# Patient Record
Sex: FEMALE | Race: WHITE | Employment: OTHER | URBAN - METROPOLITAN AREA
[De-identification: names, ages, dates, MRNs, and addresses within clinical notes are randomized per-mention and may not be internally consistent; named-entity substitution may affect disease eponyms.]

---

## 2020-03-04 ENCOUNTER — APPOINTMENT (OUTPATIENT)
Dept: GENERAL RADIOLOGY | Age: 70
End: 2020-03-04
Payer: MEDICARE

## 2020-03-04 ENCOUNTER — HOSPITAL ENCOUNTER (EMERGENCY)
Age: 70
Discharge: HOME | End: 2020-03-04
Attending: EMERGENCY MEDICINE
Payer: MEDICARE

## 2020-03-04 VITALS
DIASTOLIC BLOOD PRESSURE: 89 MMHG | WEIGHT: 280 LBS | HEIGHT: 67 IN | OXYGEN SATURATION: 97 % | TEMPERATURE: 97.9 F | RESPIRATION RATE: 20 BRPM | HEART RATE: 99 BPM | SYSTOLIC BLOOD PRESSURE: 175 MMHG | BODY MASS INDEX: 43.95 KG/M2

## 2020-03-04 PROCEDURE — 99284 EMERGENCY DEPT VISIT MOD MDM: CPT

## 2020-03-04 PROCEDURE — 6370000000 HC RX 637 (ALT 250 FOR IP): Performed by: EMERGENCY MEDICINE

## 2020-03-04 RX ORDER — HYDROXYZINE PAMOATE 25 MG/1
25 CAPSULE ORAL 3 TIMES DAILY PRN
Qty: 21 CAPSULE | Refills: 0 | Status: SHIPPED | OUTPATIENT
Start: 2020-03-04 | End: 2020-03-11

## 2020-03-04 RX ORDER — CEPHALEXIN 500 MG/1
500 CAPSULE ORAL 4 TIMES DAILY
Qty: 28 CAPSULE | Refills: 0 | Status: SHIPPED | OUTPATIENT
Start: 2020-03-04 | End: 2020-03-11

## 2020-03-04 RX ORDER — PERMETHRIN 50 MG/G
CREAM TOPICAL
Qty: 60 G | Refills: 1 | Status: SHIPPED | OUTPATIENT
Start: 2020-03-04 | End: 2021-01-01 | Stop reason: ALTCHOICE

## 2020-03-04 RX ORDER — HYDROXYZINE PAMOATE 25 MG/1
25 CAPSULE ORAL ONCE
Status: COMPLETED | OUTPATIENT
Start: 2020-03-04 | End: 2020-03-04

## 2020-03-04 RX ADMIN — HYDROXYZINE PAMOATE 25 MG: 25 CAPSULE ORAL at 02:38

## 2020-03-04 ASSESSMENT — ENCOUNTER SYMPTOMS
ABDOMINAL PAIN: 0
ABDOMINAL DISTENTION: 0
SORE THROAT: 0
DIARRHEA: 0
BACK PAIN: 0
NAUSEA: 0
SHORTNESS OF BREATH: 0
VOMITING: 0
COUGH: 0
SINUS PRESSURE: 0
WHEEZING: 0
CHEST TIGHTNESS: 0

## 2020-03-04 NOTE — ED NOTES
Placed on bedpan per this RN.  complaining of wait time for transport. Explained to gentlemen the ED cannot dictate when pt. Will be picked up. Ambulance service gives ED ETA.      Fercho Logan RN  03/04/20 [id number]

## 2020-03-04 NOTE — ED NOTES
Pt. Now refusing x-ray. Requesting something for itching of lower leg.       Janell Smith RN  03/04/20 [id number]

## 2020-03-04 NOTE — ED NOTES
Pt. States she's been cutting her toenails herself. Bilateral great toenails nearly absent. Surrounding skin noted to be peeled away/area reddened/appears seeping serous drainage at times.      Swapnil Buchanan RN  03/04/20 0415

## 2020-03-04 NOTE — ED PROVIDER NOTES
Normal range of motion and neck supple. Normal range of motion. No neck rigidity, injury, pain with movement, spinous process tenderness or muscular tenderness. Cardiovascular:      Rate and Rhythm: Normal rate and regular rhythm. Heart sounds: Normal heart sounds. No murmur. Pulmonary:      Effort: Pulmonary effort is normal. No respiratory distress. Breath sounds: Normal breath sounds. No stridor, decreased air movement or transmitted upper airway sounds. No decreased breath sounds, wheezing, rhonchi or rales. Chest:      Chest wall: No tenderness. Abdominal:      General: Bowel sounds are normal. There is no distension. Palpations: Abdomen is soft. Tenderness: There is no abdominal tenderness. There is no right CVA tenderness, left CVA tenderness, guarding or rebound. Musculoskeletal:         General: No swelling, tenderness, deformity or signs of injury. Right lower leg: No edema. Left lower leg: No edema. Comments: Patient complains of right knee pain but has no tenderness during palpation and no swelling or effusion. Musculoskeletal exam reveals no sign of acute bony or joint injury. She has no sign of acute head or face injury. No cervical, thoracic or lumbar spine tenderness. Chest wall, sternum and clavicles nontender. Pelvis is stable and nontender. Lymphadenopathy:      Cervical: No cervical adenopathy. Skin:     General: Skin is warm and dry. Coloration: Skin is not cyanotic, jaundiced, mottled or pale. Findings: Erythema present. Comments: Multiple linear scratches, abrasions to the bilateral lower extremities below the knees. No signs of abscess. No swelling or fluctuance. No particular tenderness. Several areas of mildly excoriated skin from patient scratching at it. Possible early cellulitis starting secondary to healthy scratches. No abscesses. I see no insect bites or insects.    Neurological:      General: No focal deficit present. Mental Status: She is alert and oriented to person, place, and time. GCS: GCS eye subscore is 4. GCS verbal subscore is 5. GCS motor subscore is 6. Cranial Nerves: No cranial nerve deficit. Coordination: Coordination normal.          Procedures     MDM              --------------------------------------------- PAST HISTORY ---------------------------------------------  Past Medical History:  has no past medical history on file. Past Surgical History:  has no past surgical history on file. Social History:  reports that she has never smoked. She has never used smokeless tobacco.    Family History: family history is not on file. The patients home medications have been reviewed. Allergies: Patient has no allergy information on record.    -------------------------------------------------- RESULTS -------------------------------------------------  Labs:  No results found for this visit on 03/04/20. Radiology:  No orders to display   Please note the patient refused the x-ray    ------------------------- NURSING NOTES AND VITALS REVIEWED ---------------------------  Date / Time Roomed:  3/4/2020  2:09 AM  ED Bed Assignment:  04/04    The nursing notes within the ED encounter and vital signs as below have been reviewed. BP (!) 175/89   Pulse 99   Temp 97.9 °F (36.6 °C) (Oral)   Resp 20   Ht 5' 7\" (1.702 m)   Wt 280 lb (127 kg)   SpO2 97%   BMI 43.85 kg/m²   Oxygen Saturation Interpretation: Normal      ------------------------------------------ PROGRESS NOTES ------------------------------------------  I have spoken with the patient and discussed todays results, in addition to providing specific details for the plan of care and counseling regarding the diagnosis and prognosis. Their questions are answered at this time and they are agreeable with the plan. I discussed at length with them reasons for immediate return here for re evaluation.  They will followup with

## 2021-01-01 ENCOUNTER — APPOINTMENT (OUTPATIENT)
Dept: MRI IMAGING | Age: 71
DRG: 871 | End: 2021-01-01
Payer: MEDICARE

## 2021-01-01 ENCOUNTER — APPOINTMENT (OUTPATIENT)
Dept: INTERVENTIONAL RADIOLOGY/VASCULAR | Age: 71
DRG: 871 | End: 2021-01-01
Payer: MEDICARE

## 2021-01-01 ENCOUNTER — APPOINTMENT (OUTPATIENT)
Dept: GENERAL RADIOLOGY | Age: 71
DRG: 871 | End: 2021-01-01
Payer: MEDICARE

## 2021-01-01 ENCOUNTER — APPOINTMENT (OUTPATIENT)
Dept: CT IMAGING | Age: 71
DRG: 871 | End: 2021-01-01
Payer: MEDICARE

## 2021-01-01 ENCOUNTER — ANESTHESIA EVENT (OUTPATIENT)
Dept: ENDOSCOPY | Age: 71
End: 2021-01-01

## 2021-01-01 ENCOUNTER — HOSPITAL ENCOUNTER (INPATIENT)
Age: 71
LOS: 2 days | DRG: 871 | End: 2021-11-23
Attending: STUDENT IN AN ORGANIZED HEALTH CARE EDUCATION/TRAINING PROGRAM | Admitting: STUDENT IN AN ORGANIZED HEALTH CARE EDUCATION/TRAINING PROGRAM
Payer: MEDICARE

## 2021-01-01 ENCOUNTER — ANESTHESIA (OUTPATIENT)
Dept: ENDOSCOPY | Age: 71
End: 2021-01-01

## 2021-01-01 ENCOUNTER — APPOINTMENT (OUTPATIENT)
Dept: ULTRASOUND IMAGING | Age: 71
DRG: 871 | End: 2021-01-01
Payer: MEDICARE

## 2021-01-01 VITALS
WEIGHT: 293 LBS | DIASTOLIC BLOOD PRESSURE: 45 MMHG | SYSTOLIC BLOOD PRESSURE: 90 MMHG | OXYGEN SATURATION: 71 % | TEMPERATURE: 98 F | HEIGHT: 68 IN | BODY MASS INDEX: 44.41 KG/M2

## 2021-01-01 DIAGNOSIS — S32.020A COMPRESSION FRACTURE OF L2 VERTEBRA, INITIAL ENCOUNTER (HCC): ICD-10-CM

## 2021-01-01 DIAGNOSIS — R77.8 ELEVATED TROPONIN: Primary | ICD-10-CM

## 2021-01-01 LAB
ADENOVIRUS BY PCR: NOT DETECTED
ALBUMIN SERPL-MCNC: 2.1 G/DL (ref 3.5–5.2)
ALBUMIN SERPL-MCNC: 3.4 G/DL (ref 3.5–5.2)
ALP BLD-CCNC: 130 U/L (ref 35–104)
ALP BLD-CCNC: 86 U/L (ref 35–104)
ALT SERPL-CCNC: 26 U/L (ref 0–32)
ALT SERPL-CCNC: 62 U/L (ref 0–32)
ANION GAP SERPL CALCULATED.3IONS-SCNC: 12 MMOL/L (ref 7–16)
ANION GAP SERPL CALCULATED.3IONS-SCNC: 12 MMOL/L (ref 7–16)
ANION GAP SERPL CALCULATED.3IONS-SCNC: 13 MMOL/L (ref 7–16)
ANION GAP SERPL CALCULATED.3IONS-SCNC: 14 MMOL/L (ref 7–16)
ANION GAP SERPL CALCULATED.3IONS-SCNC: 14 MMOL/L (ref 7–16)
ANION GAP SERPL CALCULATED.3IONS-SCNC: 9 MMOL/L (ref 7–16)
AST SERPL-CCNC: 141 U/L (ref 0–31)
AST SERPL-CCNC: 31 U/L (ref 0–31)
ATYPICAL LYMPHOCYTE RELATIVE PERCENT: 1.7 % (ref 0–4)
B.E.: -6.7 MMOL/L (ref -3–3)
BACTERIA: ABNORMAL /HPF
BASOPHILS ABSOLUTE: 0 E9/L (ref 0–0.2)
BASOPHILS ABSOLUTE: 0.02 E9/L (ref 0–0.2)
BASOPHILS ABSOLUTE: 0.14 E9/L (ref 0–0.2)
BASOPHILS RELATIVE PERCENT: 0.1 % (ref 0–2)
BASOPHILS RELATIVE PERCENT: 0.1 % (ref 0–2)
BASOPHILS RELATIVE PERCENT: 0.2 % (ref 0–2)
BASOPHILS RELATIVE PERCENT: 0.2 % (ref 0–2)
BASOPHILS RELATIVE PERCENT: 0.3 % (ref 0–2)
BASOPHILS RELATIVE PERCENT: 0.9 % (ref 0–2)
BETA-HYDROXYBUTYRATE: 0.7 MMOL/L (ref 0.02–0.27)
BILIRUB SERPL-MCNC: 0.8 MG/DL (ref 0–1.2)
BILIRUB SERPL-MCNC: 0.9 MG/DL (ref 0–1.2)
BILIRUBIN URINE: NEGATIVE
BLOOD, URINE: ABNORMAL
BORDETELLA PARAPERTUSSIS BY PCR: NOT DETECTED
BORDETELLA PERTUSSIS BY PCR: NOT DETECTED
BOTTLE TYPE: ABNORMAL
BUN BLDV-MCNC: 14 MG/DL (ref 6–23)
BUN BLDV-MCNC: 16 MG/DL (ref 6–23)
BUN BLDV-MCNC: 19 MG/DL (ref 6–23)
BUN BLDV-MCNC: 27 MG/DL (ref 6–23)
BUN BLDV-MCNC: 42 MG/DL (ref 6–23)
BUN BLDV-MCNC: 44 MG/DL (ref 6–23)
BURR CELLS: ABNORMAL
BURR CELLS: ABNORMAL
C-REACTIVE PROTEIN: 44.2 MG/DL (ref 0–0.4)
CALCIUM SERPL-MCNC: 7.6 MG/DL (ref 8.6–10.2)
CALCIUM SERPL-MCNC: 7.6 MG/DL (ref 8.6–10.2)
CALCIUM SERPL-MCNC: 7.7 MG/DL (ref 8.6–10.2)
CALCIUM SERPL-MCNC: 7.7 MG/DL (ref 8.6–10.2)
CALCIUM SERPL-MCNC: 7.8 MG/DL (ref 8.6–10.2)
CALCIUM SERPL-MCNC: 8.6 MG/DL (ref 8.6–10.2)
CANDIDA ALBICANS BY PCR: NOT DETECTED
CANDIDA GLABRATA BY PCR: NOT DETECTED
CANDIDA KRUSEI BY PCR: NOT DETECTED
CANDIDA PARAPSILOSIS BY PCR: NOT DETECTED
CANDIDA TROPICALIS BY PCR: NOT DETECTED
CHLAMYDOPHILIA PNEUMONIAE BY PCR: NOT DETECTED
CHLORIDE BLD-SCNC: 96 MMOL/L (ref 98–107)
CHLORIDE BLD-SCNC: 97 MMOL/L (ref 98–107)
CHLORIDE BLD-SCNC: 98 MMOL/L (ref 98–107)
CHLORIDE BLD-SCNC: 99 MMOL/L (ref 98–107)
CHOLESTEROL, TOTAL: 98 MG/DL (ref 0–199)
CLARITY: CLEAR
CO2: 19 MMOL/L (ref 22–29)
CO2: 19 MMOL/L (ref 22–29)
CO2: 22 MMOL/L (ref 22–29)
CO2: 24 MMOL/L (ref 22–29)
COHB: 0.3 % (ref 0–1.5)
COLOR: YELLOW
CORONAVIRUS 229E BY PCR: NOT DETECTED
CORONAVIRUS HKU1 BY PCR: NOT DETECTED
CORONAVIRUS NL63 BY PCR: NOT DETECTED
CORONAVIRUS OC43 BY PCR: NOT DETECTED
CREAT SERPL-MCNC: 0.7 MG/DL (ref 0.5–1)
CREAT SERPL-MCNC: 0.8 MG/DL (ref 0.5–1)
CREAT SERPL-MCNC: 0.9 MG/DL (ref 0.5–1)
CREAT SERPL-MCNC: 1.5 MG/DL (ref 0.5–1)
CREAT SERPL-MCNC: 2.2 MG/DL (ref 0.5–1)
CREAT SERPL-MCNC: 2.3 MG/DL (ref 0.5–1)
CREATININE URINE: 465 MG/DL (ref 29–226)
CRITICAL: ABNORMAL
DATE ANALYZED: ABNORMAL
DATE OF COLLECTION: ABNORMAL
DOHLE BODIES: ABNORMAL
DOHLE BODIES: ABNORMAL
EKG ATRIAL RATE: 106 BPM
EKG P AXIS: 61 DEGREES
EKG P-R INTERVAL: 152 MS
EKG Q-T INTERVAL: 352 MS
EKG QRS DURATION: 82 MS
EKG QTC CALCULATION (BAZETT): 467 MS
EKG R AXIS: -13 DEGREES
EKG T AXIS: 29 DEGREES
EKG VENTRICULAR RATE: 106 BPM
ENTEROBACTER CLOACAE COMPLEX BY PCR: NOT DETECTED
ENTEROBACTERALES BY PCR: NOT DETECTED
ENTEROCOCCUS BY PCR: NOT DETECTED
EOSINOPHILS ABSOLUTE: 0 E9/L (ref 0.05–0.5)
EOSINOPHILS RELATIVE PERCENT: 0 % (ref 0–6)
EOSINOPHILS RELATIVE PERCENT: 0.1 % (ref 0–6)
EOSINOPHILS RELATIVE PERCENT: 0.5 % (ref 0–6)
EPITHELIAL CELLS, UA: ABNORMAL /HPF
ESCHERICHIA COLI BY PCR: NOT DETECTED
FERRITIN: 1099 NG/ML
FIO2: 80 %
GFR AFRICAN AMERICAN: 25
GFR AFRICAN AMERICAN: 27
GFR AFRICAN AMERICAN: 41
GFR AFRICAN AMERICAN: >60
GFR NON-AFRICAN AMERICAN: 21 ML/MIN/1.73
GFR NON-AFRICAN AMERICAN: 22 ML/MIN/1.73
GFR NON-AFRICAN AMERICAN: 34 ML/MIN/1.73
GFR NON-AFRICAN AMERICAN: >60 ML/MIN/1.73
GLUCOSE BLD-MCNC: 167 MG/DL (ref 74–99)
GLUCOSE BLD-MCNC: 203 MG/DL (ref 74–99)
GLUCOSE BLD-MCNC: 212 MG/DL (ref 74–99)
GLUCOSE BLD-MCNC: 222 MG/DL (ref 74–99)
GLUCOSE BLD-MCNC: 252 MG/DL (ref 74–99)
GLUCOSE BLD-MCNC: 69 MG/DL (ref 74–99)
GLUCOSE URINE: 500 MG/DL
GRAM STAIN ORDERABLE: NORMAL
HAEMOPHILUS INFLUENZAE BY PCR: NOT DETECTED
HBA1C MFR BLD: 7.9 % (ref 4–5.6)
HCO3: 18 MMOL/L (ref 22–26)
HCT VFR BLD CALC: 28 % (ref 34–48)
HCT VFR BLD CALC: 28.1 % (ref 34–48)
HCT VFR BLD CALC: 28.1 % (ref 34–48)
HCT VFR BLD CALC: 31.2 % (ref 34–48)
HCT VFR BLD CALC: 33.5 % (ref 34–48)
HCT VFR BLD CALC: 37.5 % (ref 34–48)
HDLC SERPL-MCNC: 25 MG/DL
HEMOGLOBIN: 10.6 G/DL (ref 11.5–15.5)
HEMOGLOBIN: 12.2 G/DL (ref 11.5–15.5)
HEMOGLOBIN: 8.9 G/DL (ref 11.5–15.5)
HEMOGLOBIN: 9 G/DL (ref 11.5–15.5)
HEMOGLOBIN: 9.3 G/DL (ref 11.5–15.5)
HEMOGLOBIN: 9.8 G/DL (ref 11.5–15.5)
HHB: 6 % (ref 0–5)
HUMAN METAPNEUMOVIRUS BY PCR: NOT DETECTED
HUMAN RHINOVIRUS/ENTEROVIRUS BY PCR: NOT DETECTED
HYPOCHROMIA: ABNORMAL
IMMATURE GRANULOCYTES #: 0.13 E9/L
IMMATURE GRANULOCYTES %: 0.9 % (ref 0–5)
INFLUENZA A BY PCR: NOT DETECTED
INFLUENZA B BY PCR: NOT DETECTED
INR BLD: 1.3
IRON SATURATION: 16 % (ref 15–50)
IRON: 19 MCG/DL (ref 37–145)
KETONES, URINE: >=80 MG/DL
KLEBSIELLA OXYTOCA BY PCR: NOT DETECTED
KLEBSIELLA PNEUMONIAE GROUP BY PCR: NOT DETECTED
LAB: ABNORMAL
LACTIC ACID: 1.9 MMOL/L (ref 0.5–2.2)
LACTIC ACID: 2 MMOL/L (ref 0.5–2.2)
LDL CHOLESTEROL CALCULATED: 51 MG/DL (ref 0–99)
LEUKOCYTE ESTERASE, URINE: NEGATIVE
LIPASE: 10 U/L (ref 13–60)
LISTERIA MONOCYTOGENES BY PCR: NOT DETECTED
LV EF: 48 %
LVEF MODALITY: NORMAL
LYMPHOCYTES ABSOLUTE: 0.31 E9/L (ref 1.5–4)
LYMPHOCYTES ABSOLUTE: 0.39 E9/L (ref 1.5–4)
LYMPHOCYTES ABSOLUTE: 0.46 E9/L (ref 1.5–4)
LYMPHOCYTES ABSOLUTE: 0.56 E9/L (ref 1.5–4)
LYMPHOCYTES ABSOLUTE: 0.58 E9/L (ref 1.5–4)
LYMPHOCYTES ABSOLUTE: 0.72 E9/L (ref 1.5–4)
LYMPHOCYTES RELATIVE PERCENT: 0.9 % (ref 20–42)
LYMPHOCYTES RELATIVE PERCENT: 1.8 % (ref 20–42)
LYMPHOCYTES RELATIVE PERCENT: 2.6 % (ref 20–42)
LYMPHOCYTES RELATIVE PERCENT: 3.1 % (ref 20–42)
LYMPHOCYTES RELATIVE PERCENT: 3.5 % (ref 20–42)
LYMPHOCYTES RELATIVE PERCENT: 5.2 % (ref 20–42)
Lab: ABNORMAL
MAGNESIUM: 1.8 MG/DL (ref 1.6–2.6)
MCH RBC QN AUTO: 28 PG (ref 26–35)
MCH RBC QN AUTO: 28 PG (ref 26–35)
MCH RBC QN AUTO: 28.3 PG (ref 26–35)
MCH RBC QN AUTO: 28.4 PG (ref 26–35)
MCH RBC QN AUTO: 28.5 PG (ref 26–35)
MCH RBC QN AUTO: 28.7 PG (ref 26–35)
MCHC RBC AUTO-ENTMCNC: 31.4 % (ref 32–34.5)
MCHC RBC AUTO-ENTMCNC: 31.6 % (ref 32–34.5)
MCHC RBC AUTO-ENTMCNC: 31.7 % (ref 32–34.5)
MCHC RBC AUTO-ENTMCNC: 32 % (ref 32–34.5)
MCHC RBC AUTO-ENTMCNC: 32.5 % (ref 32–34.5)
MCHC RBC AUTO-ENTMCNC: 33.2 % (ref 32–34.5)
MCV RBC AUTO: 85.9 FL (ref 80–99.9)
MCV RBC AUTO: 87.2 FL (ref 80–99.9)
MCV RBC AUTO: 88.4 FL (ref 80–99.9)
MCV RBC AUTO: 89.1 FL (ref 80–99.9)
MCV RBC AUTO: 89.3 FL (ref 80–99.9)
MCV RBC AUTO: 89.5 FL (ref 80–99.9)
METER GLUCOSE: 124 MG/DL (ref 74–99)
METER GLUCOSE: 168 MG/DL (ref 74–99)
METER GLUCOSE: 169 MG/DL (ref 74–99)
METER GLUCOSE: 173 MG/DL (ref 74–99)
METER GLUCOSE: 174 MG/DL (ref 74–99)
METER GLUCOSE: 175 MG/DL (ref 74–99)
METER GLUCOSE: 180 MG/DL (ref 74–99)
METER GLUCOSE: 182 MG/DL (ref 74–99)
METER GLUCOSE: 203 MG/DL (ref 74–99)
METER GLUCOSE: 206 MG/DL (ref 74–99)
METER GLUCOSE: 218 MG/DL (ref 74–99)
METER GLUCOSE: 219 MG/DL (ref 74–99)
METER GLUCOSE: 221 MG/DL (ref 74–99)
METER GLUCOSE: 245 MG/DL (ref 74–99)
METER GLUCOSE: 90 MG/DL (ref 74–99)
METHB: 0.3 % (ref 0–1.5)
METHICILLIN RESISTANCE MECA/C  BY PCR: NOT DETECTED
MODE: AC
MONOCYTES ABSOLUTE: 0.13 E9/L (ref 0.1–0.95)
MONOCYTES ABSOLUTE: 0.29 E9/L (ref 0.1–0.95)
MONOCYTES ABSOLUTE: 0.31 E9/L (ref 0.1–0.95)
MONOCYTES ABSOLUTE: 0.4 E9/L (ref 0.1–0.95)
MONOCYTES ABSOLUTE: 0.43 E9/L (ref 0.1–0.95)
MONOCYTES ABSOLUTE: 0.75 E9/L (ref 0.1–0.95)
MONOCYTES RELATIVE PERCENT: 0.9 % (ref 2–12)
MONOCYTES RELATIVE PERCENT: 1.7 % (ref 2–12)
MONOCYTES RELATIVE PERCENT: 1.8 % (ref 2–12)
MONOCYTES RELATIVE PERCENT: 2.6 % (ref 2–12)
MONOCYTES RELATIVE PERCENT: 2.7 % (ref 2–12)
MONOCYTES RELATIVE PERCENT: 3.5 % (ref 2–12)
MYCOPLASMA PNEUMONIAE BY PCR: NOT DETECTED
MYELOCYTE PERCENT: 0.9 % (ref 0–0)
NEISSERIA MENINGITIDIS BY PCR: NOT DETECTED
NEUTROPHILS ABSOLUTE: 12.61 E9/L (ref 1.8–7.3)
NEUTROPHILS ABSOLUTE: 13.25 E9/L (ref 1.8–7.3)
NEUTROPHILS ABSOLUTE: 13.81 E9/L (ref 1.8–7.3)
NEUTROPHILS ABSOLUTE: 13.87 E9/L (ref 1.8–7.3)
NEUTROPHILS ABSOLUTE: 15.07 E9/L (ref 1.8–7.3)
NEUTROPHILS ABSOLUTE: 17.67 E9/L (ref 1.8–7.3)
NEUTROPHILS RELATIVE PERCENT: 92.2 % (ref 43–80)
NEUTROPHILS RELATIVE PERCENT: 93.2 % (ref 43–80)
NEUTROPHILS RELATIVE PERCENT: 93.9 % (ref 43–80)
NEUTROPHILS RELATIVE PERCENT: 93.9 % (ref 43–80)
NEUTROPHILS RELATIVE PERCENT: 95.6 % (ref 43–80)
NEUTROPHILS RELATIVE PERCENT: 96.5 % (ref 43–80)
NITRITE, URINE: NEGATIVE
O2 CONTENT: 13.7 ML/DL
O2 SATURATION: 94 % (ref 92–98.5)
O2HB: 93.4 % (ref 94–97)
OPERATOR ID: 2658
ORDER NUMBER: ABNORMAL
ORGANISM: ABNORMAL
OVALOCYTES: ABNORMAL
OVALOCYTES: ABNORMAL
PARAINFLUENZA VIRUS 1 BY PCR: NOT DETECTED
PARAINFLUENZA VIRUS 2 BY PCR: NOT DETECTED
PARAINFLUENZA VIRUS 3 BY PCR: NOT DETECTED
PARAINFLUENZA VIRUS 4 BY PCR: NOT DETECTED
PATIENT TEMP: 37
PCO2: 32.9 MMHG (ref 35–45)
PDW BLD-RTO: 13.4 FL (ref 11.5–15)
PDW BLD-RTO: 13.5 FL (ref 11.5–15)
PDW BLD-RTO: 13.8 FL (ref 11.5–15)
PDW BLD-RTO: 13.9 FL (ref 11.5–15)
PDW BLD-RTO: 14.1 FL (ref 11.5–15)
PDW BLD-RTO: 14.4 FL (ref 11.5–15)
PEEP/CPAP: 5 CMH2O
PFO2: 0.96 MMHG/%
PH BLOOD GAS: 7.36 (ref 7.35–7.45)
PH UA: 5.5 (ref 5–9)
PH VENOUS: 7.37 (ref 7.35–7.45)
PHOSPHORUS: 4.1 MG/DL (ref 2.5–4.5)
PLATELET # BLD: 143 E9/L (ref 130–450)
PLATELET # BLD: 143 E9/L (ref 130–450)
PLATELET # BLD: 160 E9/L (ref 130–450)
PLATELET # BLD: 164 E9/L (ref 130–450)
PLATELET # BLD: 167 E9/L (ref 130–450)
PLATELET # BLD: 189 E9/L (ref 130–450)
PMV BLD AUTO: 10.6 FL (ref 7–12)
PMV BLD AUTO: 10.7 FL (ref 7–12)
PMV BLD AUTO: 10.9 FL (ref 7–12)
PMV BLD AUTO: 11 FL (ref 7–12)
PMV BLD AUTO: 11.3 FL (ref 7–12)
PMV BLD AUTO: 9.9 FL (ref 7–12)
PO2: 76.5 MMHG (ref 75–100)
POIKILOCYTES: ABNORMAL
POIKILOCYTES: ABNORMAL
POLYCHROMASIA: ABNORMAL
POTASSIUM REFLEX MAGNESIUM: 3.7 MMOL/L (ref 3.5–5)
POTASSIUM REFLEX MAGNESIUM: 3.9 MMOL/L (ref 3.5–5)
POTASSIUM REFLEX MAGNESIUM: 4 MMOL/L (ref 3.5–5)
POTASSIUM REFLEX MAGNESIUM: 4.5 MMOL/L (ref 3.5–5)
POTASSIUM REFLEX MAGNESIUM: 4.6 MMOL/L (ref 3.5–5)
POTASSIUM SERPL-SCNC: 4.6 MMOL/L (ref 3.5–5)
PROCALCITONIN: 4.7 NG/ML (ref 0–0.08)
PROTEIN UA: 30 MG/DL
PROTEUS SPECIES BY PCR: NOT DETECTED
PROTHROMBIN TIME: 14.9 SEC (ref 9.3–12.4)
PSEUDOMONAS AERUGINOSA BY PCR: NOT DETECTED
RBC # BLD: 3.14 E12/L (ref 3.5–5.5)
RBC # BLD: 3.18 E12/L (ref 3.5–5.5)
RBC # BLD: 3.26 E12/L (ref 3.5–5.5)
RBC # BLD: 3.5 E12/L (ref 3.5–5.5)
RBC # BLD: 3.75 E12/L (ref 3.5–5.5)
RBC # BLD: 4.3 E12/L (ref 3.5–5.5)
RBC UA: ABNORMAL /HPF (ref 0–2)
RESPIRATORY SYNCYTIAL VIRUS BY PCR: NOT DETECTED
RR MECHANICAL: 20 B/MIN
SARS-COV-2, NAAT: NOT DETECTED
SARS-COV-2, PCR: NOT DETECTED
SERRATIA MARCESCENS BY PCR: NOT DETECTED
SODIUM BLD-SCNC: 128 MMOL/L (ref 132–146)
SODIUM BLD-SCNC: 129 MMOL/L (ref 132–146)
SODIUM BLD-SCNC: 132 MMOL/L (ref 132–146)
SODIUM BLD-SCNC: 132 MMOL/L (ref 132–146)
SODIUM BLD-SCNC: 133 MMOL/L (ref 132–146)
SODIUM BLD-SCNC: 134 MMOL/L (ref 132–146)
SODIUM URINE: <20 MMOL/L
SOURCE OF BLOOD CULTURE: ABNORMAL
SOURCE, BLOOD GAS: ABNORMAL
SPECIFIC GRAVITY UA: >=1.03 (ref 1–1.03)
STAPHYLOCOCCUS AUREUS BY PCR: DETECTED
STAPHYLOCOCCUS SPECIES BY PCR: DETECTED
STREPTOCOCCUS AGALACTIAE BY PCR: NOT DETECTED
STREPTOCOCCUS PNEUMONIAE BY PCR: NOT DETECTED
STREPTOCOCCUS PYOGENES  BY PCR: NOT DETECTED
STREPTOCOCCUS SPECIES BY PCR: NOT DETECTED
TARGET CELLS: ABNORMAL
THB: 10.4 G/DL (ref 11.5–16.5)
TIME ANALYZED: 1434
TOTAL IRON BINDING CAPACITY: 121 MCG/DL (ref 250–450)
TOTAL PROTEIN: 5.6 G/DL (ref 6.4–8.3)
TOTAL PROTEIN: 6.8 G/DL (ref 6.4–8.3)
TOXIC GRANULATION: ABNORMAL
TOXIC GRANULATION: ABNORMAL
TRIGL SERPL-MCNC: 109 MG/DL (ref 0–149)
TROPONIN, HIGH SENSITIVITY: 112 NG/L (ref 0–9)
TROPONIN, HIGH SENSITIVITY: 190 NG/L (ref 0–9)
TROPONIN, HIGH SENSITIVITY: 58 NG/L (ref 0–9)
TROPONIN, HIGH SENSITIVITY: 83 NG/L (ref 0–9)
TROPONIN, HIGH SENSITIVITY: 99 NG/L (ref 0–9)
TSH SERPL DL<=0.05 MIU/L-ACNC: 0.91 UIU/ML (ref 0.27–4.2)
URINE CULTURE, ROUTINE: ABNORMAL
UROBILINOGEN, URINE: 1 E.U./DL
VANCOMYCIN RANDOM: 16 MCG/ML (ref 5–40)
VLDLC SERPL CALC-MCNC: 22 MG/DL
VT MECHANICAL: 450 ML
WBC # BLD: 13 E9/L (ref 4.5–11.5)
WBC # BLD: 14.4 E9/L (ref 4.5–11.5)
WBC # BLD: 14.6 E9/L (ref 4.5–11.5)
WBC # BLD: 14.8 E9/L (ref 4.5–11.5)
WBC # BLD: 15.7 E9/L (ref 4.5–11.5)
WBC # BLD: 18.8 E9/L (ref 4.5–11.5)
WBC UA: ABNORMAL /HPF (ref 0–5)

## 2021-01-01 PROCEDURE — 6360000002 HC RX W HCPCS: Performed by: NURSE PRACTITIONER

## 2021-01-01 PROCEDURE — G0378 HOSPITAL OBSERVATION PER HR: HCPCS

## 2021-01-01 PROCEDURE — 80048 BASIC METABOLIC PNL TOTAL CA: CPT

## 2021-01-01 PROCEDURE — 73620 X-RAY EXAM OF FOOT: CPT

## 2021-01-01 PROCEDURE — 2580000003 HC RX 258: Performed by: STUDENT IN AN ORGANIZED HEALTH CARE EDUCATION/TRAINING PROGRAM

## 2021-01-01 PROCEDURE — 6360000002 HC RX W HCPCS: Performed by: STUDENT IN AN ORGANIZED HEALTH CARE EDUCATION/TRAINING PROGRAM

## 2021-01-01 PROCEDURE — 2580000003 HC RX 258: Performed by: HOSPITALIST

## 2021-01-01 PROCEDURE — 72148 MRI LUMBAR SPINE W/O DYE: CPT

## 2021-01-01 PROCEDURE — 97161 PT EVAL LOW COMPLEX 20 MIN: CPT | Performed by: PHYSICAL THERAPIST

## 2021-01-01 PROCEDURE — 86140 C-REACTIVE PROTEIN: CPT

## 2021-01-01 PROCEDURE — 84300 ASSAY OF URINE SODIUM: CPT

## 2021-01-01 PROCEDURE — 70450 CT HEAD/BRAIN W/O DYE: CPT

## 2021-01-01 PROCEDURE — 99291 CRITICAL CARE FIRST HOUR: CPT | Performed by: INTERNAL MEDICINE

## 2021-01-01 PROCEDURE — 6370000000 HC RX 637 (ALT 250 FOR IP): Performed by: INTERNAL MEDICINE

## 2021-01-01 PROCEDURE — 96374 THER/PROPH/DIAG INJ IV PUSH: CPT

## 2021-01-01 PROCEDURE — 84100 ASSAY OF PHOSPHORUS: CPT

## 2021-01-01 PROCEDURE — 85025 COMPLETE CBC W/AUTO DIFF WBC: CPT

## 2021-01-01 PROCEDURE — 84145 PROCALCITONIN (PCT): CPT

## 2021-01-01 PROCEDURE — 6370000000 HC RX 637 (ALT 250 FOR IP): Performed by: STUDENT IN AN ORGANIZED HEALTH CARE EDUCATION/TRAINING PROGRAM

## 2021-01-01 PROCEDURE — 2580000003 HC RX 258: Performed by: NURSE PRACTITIONER

## 2021-01-01 PROCEDURE — 02HV33Z INSERTION OF INFUSION DEVICE INTO SUPERIOR VENA CAVA, PERCUTANEOUS APPROACH: ICD-10-PCS | Performed by: EMERGENCY MEDICINE

## 2021-01-01 PROCEDURE — 82962 GLUCOSE BLOOD TEST: CPT

## 2021-01-01 PROCEDURE — 96372 THER/PROPH/DIAG INJ SC/IM: CPT

## 2021-01-01 PROCEDURE — 97110 THERAPEUTIC EXERCISES: CPT

## 2021-01-01 PROCEDURE — 87088 URINE BACTERIA CULTURE: CPT

## 2021-01-01 PROCEDURE — 84484 ASSAY OF TROPONIN QUANT: CPT

## 2021-01-01 PROCEDURE — 87070 CULTURE OTHR SPECIMN AEROBIC: CPT

## 2021-01-01 PROCEDURE — C9113 INJ PANTOPRAZOLE SODIUM, VIA: HCPCS | Performed by: HOSPITALIST

## 2021-01-01 PROCEDURE — APPSS30 APP SPLIT SHARED TIME 16-30 MINUTES: Performed by: NURSE PRACTITIONER

## 2021-01-01 PROCEDURE — 2700000000 HC OXYGEN THERAPY PER DAY

## 2021-01-01 PROCEDURE — 2500000003 HC RX 250 WO HCPCS: Performed by: INTERNAL MEDICINE

## 2021-01-01 PROCEDURE — 99285 EMERGENCY DEPT VISIT HI MDM: CPT

## 2021-01-01 PROCEDURE — 2000000000 HC ICU R&B

## 2021-01-01 PROCEDURE — 93005 ELECTROCARDIOGRAM TRACING: CPT | Performed by: STUDENT IN AN ORGANIZED HEALTH CARE EDUCATION/TRAINING PROGRAM

## 2021-01-01 PROCEDURE — 99232 SBSQ HOSP IP/OBS MODERATE 35: CPT | Performed by: INTERNAL MEDICINE

## 2021-01-01 PROCEDURE — 36415 COLL VENOUS BLD VENIPUNCTURE: CPT

## 2021-01-01 PROCEDURE — 6360000002 HC RX W HCPCS

## 2021-01-01 PROCEDURE — 97530 THERAPEUTIC ACTIVITIES: CPT | Performed by: PHYSICAL THERAPIST

## 2021-01-01 PROCEDURE — 71045 X-RAY EXAM CHEST 1 VIEW: CPT

## 2021-01-01 PROCEDURE — 6360000004 HC RX CONTRAST MEDICATION: Performed by: RADIOLOGY

## 2021-01-01 PROCEDURE — 87205 SMEAR GRAM STAIN: CPT

## 2021-01-01 PROCEDURE — 51702 INSERT TEMP BLADDER CATH: CPT

## 2021-01-01 PROCEDURE — 76705 ECHO EXAM OF ABDOMEN: CPT

## 2021-01-01 PROCEDURE — 97535 SELF CARE MNGMENT TRAINING: CPT

## 2021-01-01 PROCEDURE — 96375 TX/PRO/DX INJ NEW DRUG ADDON: CPT

## 2021-01-01 PROCEDURE — 87635 SARS-COV-2 COVID-19 AMP PRB: CPT

## 2021-01-01 PROCEDURE — 71250 CT THORAX DX C-: CPT

## 2021-01-01 PROCEDURE — 82570 ASSAY OF URINE CREATININE: CPT

## 2021-01-01 PROCEDURE — 87150 DNA/RNA AMPLIFIED PROBE: CPT

## 2021-01-01 PROCEDURE — 83605 ASSAY OF LACTIC ACID: CPT

## 2021-01-01 PROCEDURE — 80202 ASSAY OF VANCOMYCIN: CPT

## 2021-01-01 PROCEDURE — 82010 KETONE BODYS QUAN: CPT

## 2021-01-01 PROCEDURE — 99232 SBSQ HOSP IP/OBS MODERATE 35: CPT | Performed by: STUDENT IN AN ORGANIZED HEALTH CARE EDUCATION/TRAINING PROGRAM

## 2021-01-01 PROCEDURE — 87040 BLOOD CULTURE FOR BACTERIA: CPT

## 2021-01-01 PROCEDURE — 87077 CULTURE AEROBIC IDENTIFY: CPT

## 2021-01-01 PROCEDURE — 73630 X-RAY EXAM OF FOOT: CPT

## 2021-01-01 PROCEDURE — 83540 ASSAY OF IRON: CPT

## 2021-01-01 PROCEDURE — 82728 ASSAY OF FERRITIN: CPT

## 2021-01-01 PROCEDURE — 81001 URINALYSIS AUTO W/SCOPE: CPT

## 2021-01-01 PROCEDURE — 93923 UPR/LXTR ART STDY 3+ LVLS: CPT

## 2021-01-01 PROCEDURE — 6360000002 HC RX W HCPCS: Performed by: HOSPITALIST

## 2021-01-01 PROCEDURE — 93005 ELECTROCARDIOGRAM TRACING: CPT | Performed by: INTERNAL MEDICINE

## 2021-01-01 PROCEDURE — 74177 CT ABD & PELVIS W/CONTRAST: CPT

## 2021-01-01 PROCEDURE — 99222 1ST HOSP IP/OBS MODERATE 55: CPT | Performed by: STUDENT IN AN ORGANIZED HEALTH CARE EDUCATION/TRAINING PROGRAM

## 2021-01-01 PROCEDURE — 72125 CT NECK SPINE W/O DYE: CPT

## 2021-01-01 PROCEDURE — 96376 TX/PRO/DX INJ SAME DRUG ADON: CPT

## 2021-01-01 PROCEDURE — 82800 BLOOD PH: CPT

## 2021-01-01 PROCEDURE — 2500000003 HC RX 250 WO HCPCS: Performed by: STUDENT IN AN ORGANIZED HEALTH CARE EDUCATION/TRAINING PROGRAM

## 2021-01-01 PROCEDURE — 80061 LIPID PANEL: CPT

## 2021-01-01 PROCEDURE — 74018 RADEX ABDOMEN 1 VIEW: CPT

## 2021-01-01 PROCEDURE — 1200000000 HC SEMI PRIVATE

## 2021-01-01 PROCEDURE — 82805 BLOOD GASES W/O2 SATURATION: CPT

## 2021-01-01 PROCEDURE — 83036 HEMOGLOBIN GLYCOSYLATED A1C: CPT

## 2021-01-01 PROCEDURE — 87186 SC STD MICRODIL/AGAR DIL: CPT

## 2021-01-01 PROCEDURE — 99204 OFFICE O/P NEW MOD 45 MIN: CPT | Performed by: INTERNAL MEDICINE

## 2021-01-01 PROCEDURE — 80053 COMPREHEN METABOLIC PANEL: CPT

## 2021-01-01 PROCEDURE — 97530 THERAPEUTIC ACTIVITIES: CPT

## 2021-01-01 PROCEDURE — 2580000003 HC RX 258: Performed by: INTERNAL MEDICINE

## 2021-01-01 PROCEDURE — 99233 SBSQ HOSP IP/OBS HIGH 50: CPT | Performed by: INTERNAL MEDICINE

## 2021-01-01 PROCEDURE — 93308 TTE F-UP OR LMTD: CPT

## 2021-01-01 PROCEDURE — 83735 ASSAY OF MAGNESIUM: CPT

## 2021-01-01 PROCEDURE — 83550 IRON BINDING TEST: CPT

## 2021-01-01 PROCEDURE — 0202U NFCT DS 22 TRGT SARS-COV-2: CPT

## 2021-01-01 PROCEDURE — 83690 ASSAY OF LIPASE: CPT

## 2021-01-01 PROCEDURE — 97165 OT EVAL LOW COMPLEX 30 MIN: CPT

## 2021-01-01 PROCEDURE — 85610 PROTHROMBIN TIME: CPT

## 2021-01-01 PROCEDURE — 2500000003 HC RX 250 WO HCPCS

## 2021-01-01 PROCEDURE — 6370000000 HC RX 637 (ALT 250 FOR IP)

## 2021-01-01 PROCEDURE — 84443 ASSAY THYROID STIM HORMONE: CPT

## 2021-01-01 PROCEDURE — 99231 SBSQ HOSP IP/OBS SF/LOW 25: CPT | Performed by: INTERNAL MEDICINE

## 2021-01-01 PROCEDURE — 99239 HOSP IP/OBS DSCHRG MGMT >30: CPT | Performed by: STUDENT IN AN ORGANIZED HEALTH CARE EDUCATION/TRAINING PROGRAM

## 2021-01-01 RX ORDER — SODIUM CHLORIDE 9 MG/ML
10 INJECTION INTRAVENOUS 2 TIMES DAILY
Status: DISCONTINUED | OUTPATIENT
Start: 2021-01-01 | End: 2021-11-24 | Stop reason: HOSPADM

## 2021-01-01 RX ORDER — LORAZEPAM 2 MG/ML
1 INJECTION INTRAMUSCULAR EVERY 6 HOURS PRN
Status: DISCONTINUED | OUTPATIENT
Start: 2021-01-01 | End: 2021-01-01

## 2021-01-01 RX ORDER — MORPHINE SULFATE 10 MG/ML
6 INJECTION, SOLUTION INTRAMUSCULAR; INTRAVENOUS ONCE
Status: COMPLETED | OUTPATIENT
Start: 2021-01-01 | End: 2021-01-01

## 2021-01-01 RX ORDER — ATORVASTATIN CALCIUM 40 MG/1
40 TABLET, FILM COATED ORAL NIGHTLY
Status: DISCONTINUED | OUTPATIENT
Start: 2021-01-01 | End: 2021-11-24 | Stop reason: HOSPADM

## 2021-01-01 RX ORDER — SODIUM CHLORIDE 0.9 % (FLUSH) 0.9 %
5-40 SYRINGE (ML) INJECTION PRN
Status: DISCONTINUED | OUTPATIENT
Start: 2021-01-01 | End: 2021-11-24 | Stop reason: HOSPADM

## 2021-01-01 RX ORDER — ONDANSETRON 2 MG/ML
4 INJECTION INTRAMUSCULAR; INTRAVENOUS EVERY 6 HOURS PRN
Status: DISCONTINUED | OUTPATIENT
Start: 2021-01-01 | End: 2021-11-24 | Stop reason: HOSPADM

## 2021-01-01 RX ORDER — NICOTINE POLACRILEX 4 MG
15 LOZENGE BUCCAL PRN
Status: DISCONTINUED | OUTPATIENT
Start: 2021-01-01 | End: 2021-11-24 | Stop reason: HOSPADM

## 2021-01-01 RX ORDER — ASPIRIN 325 MG
325 TABLET ORAL ONCE
Status: COMPLETED | OUTPATIENT
Start: 2021-01-01 | End: 2021-01-01

## 2021-01-01 RX ORDER — LORAZEPAM 2 MG/ML
1 INJECTION INTRAMUSCULAR
Status: DISCONTINUED | OUTPATIENT
Start: 2021-01-01 | End: 2021-11-24 | Stop reason: HOSPADM

## 2021-01-01 RX ORDER — ACETAMINOPHEN 325 MG/1
650 TABLET ORAL EVERY 6 HOURS PRN
Status: DISCONTINUED | OUTPATIENT
Start: 2021-01-01 | End: 2021-11-24 | Stop reason: HOSPADM

## 2021-01-01 RX ORDER — CARVEDILOL 6.25 MG/1
12.5 TABLET ORAL 2 TIMES DAILY WITH MEALS
Status: DISCONTINUED | OUTPATIENT
Start: 2021-01-01 | End: 2021-11-24 | Stop reason: HOSPADM

## 2021-01-01 RX ORDER — CARVEDILOL 6.25 MG/1
6.25 TABLET ORAL 2 TIMES DAILY WITH MEALS
Status: DISCONTINUED | OUTPATIENT
Start: 2021-01-01 | End: 2021-01-01

## 2021-01-01 RX ORDER — IBUPROFEN 200 MG
400 TABLET ORAL EVERY 6 HOURS PRN
COMMUNITY

## 2021-01-01 RX ORDER — SODIUM CHLORIDE 0.9 % (FLUSH) 0.9 %
5-40 SYRINGE (ML) INJECTION EVERY 12 HOURS SCHEDULED
Status: DISCONTINUED | OUTPATIENT
Start: 2021-01-01 | End: 2021-11-24 | Stop reason: HOSPADM

## 2021-01-01 RX ORDER — DEXTROSE MONOHYDRATE 50 MG/ML
100 INJECTION, SOLUTION INTRAVENOUS PRN
Status: DISCONTINUED | OUTPATIENT
Start: 2021-01-01 | End: 2021-11-24 | Stop reason: HOSPADM

## 2021-01-01 RX ORDER — PANTOPRAZOLE SODIUM 40 MG/10ML
40 INJECTION, POWDER, LYOPHILIZED, FOR SOLUTION INTRAVENOUS 2 TIMES DAILY
Status: DISCONTINUED | OUTPATIENT
Start: 2021-01-01 | End: 2021-11-24 | Stop reason: HOSPADM

## 2021-01-01 RX ORDER — SODIUM CHLORIDE 9 MG/ML
INJECTION, SOLUTION INTRAVENOUS CONTINUOUS
Status: DISCONTINUED | OUTPATIENT
Start: 2021-01-01 | End: 2021-11-24 | Stop reason: HOSPADM

## 2021-01-01 RX ORDER — ACETAMINOPHEN 650 MG/1
650 SUPPOSITORY RECTAL EVERY 6 HOURS PRN
Status: DISCONTINUED | OUTPATIENT
Start: 2021-01-01 | End: 2021-11-24 | Stop reason: HOSPADM

## 2021-01-01 RX ORDER — MORPHINE SULFATE 4 MG/ML
4 INJECTION, SOLUTION INTRAMUSCULAR; INTRAVENOUS ONCE
Status: DISCONTINUED | OUTPATIENT
Start: 2021-01-01 | End: 2021-01-01

## 2021-01-01 RX ORDER — ONDANSETRON 2 MG/ML
4 INJECTION INTRAMUSCULAR; INTRAVENOUS EVERY 6 HOURS PRN
Status: DISCONTINUED | OUTPATIENT
Start: 2021-01-01 | End: 2021-01-01 | Stop reason: SDUPTHER

## 2021-01-01 RX ORDER — ONDANSETRON 4 MG/1
4 TABLET, ORALLY DISINTEGRATING ORAL EVERY 8 HOURS PRN
Status: DISCONTINUED | OUTPATIENT
Start: 2021-01-01 | End: 2021-11-24 | Stop reason: HOSPADM

## 2021-01-01 RX ORDER — LORAZEPAM 1 MG/1
1 TABLET ORAL
Status: DISCONTINUED | OUTPATIENT
Start: 2021-01-01 | End: 2021-01-01

## 2021-01-01 RX ORDER — GLYCOPYRROLATE 0.2 MG/ML
0.4 INJECTION INTRAMUSCULAR; INTRAVENOUS EVERY 4 HOURS PRN
Status: DISCONTINUED | OUTPATIENT
Start: 2021-01-01 | End: 2021-11-24 | Stop reason: HOSPADM

## 2021-01-01 RX ORDER — POLYETHYLENE GLYCOL 3350 17 G/17G
17 POWDER, FOR SOLUTION ORAL DAILY PRN
Status: DISCONTINUED | OUTPATIENT
Start: 2021-01-01 | End: 2021-11-24 | Stop reason: HOSPADM

## 2021-01-01 RX ORDER — SODIUM CHLORIDE 9 MG/ML
25 INJECTION, SOLUTION INTRAVENOUS PRN
Status: DISCONTINUED | OUTPATIENT
Start: 2021-01-01 | End: 2021-11-24 | Stop reason: HOSPADM

## 2021-01-01 RX ORDER — AMPICILLIN TRIHYDRATE 250 MG
2 CAPSULE ORAL DAILY
COMMUNITY

## 2021-01-01 RX ORDER — MORPHINE SULFATE 2 MG/ML
2 INJECTION, SOLUTION INTRAMUSCULAR; INTRAVENOUS
Status: DISCONTINUED | OUTPATIENT
Start: 2021-01-01 | End: 2021-11-24 | Stop reason: HOSPADM

## 2021-01-01 RX ORDER — HYDROCODONE BITARTRATE AND ACETAMINOPHEN 5; 325 MG/1; MG/1
1 TABLET ORAL EVERY 6 HOURS PRN
Status: DISCONTINUED | OUTPATIENT
Start: 2021-01-01 | End: 2021-11-24 | Stop reason: HOSPADM

## 2021-01-01 RX ORDER — DEXTROSE MONOHYDRATE 25 G/50ML
12.5 INJECTION, SOLUTION INTRAVENOUS PRN
Status: DISCONTINUED | OUTPATIENT
Start: 2021-01-01 | End: 2021-11-24 | Stop reason: HOSPADM

## 2021-01-01 RX ADMIN — SODIUM CHLORIDE, PRESERVATIVE FREE 10 ML: 5 INJECTION INTRAVENOUS at 20:44

## 2021-01-01 RX ADMIN — INSULIN LISPRO 4 UNITS: 100 INJECTION, SOLUTION INTRAVENOUS; SUBCUTANEOUS at 17:00

## 2021-01-01 RX ADMIN — MORPHINE SULFATE 6 MG: 10 INJECTION, SOLUTION INTRAMUSCULAR; INTRAVENOUS at 14:07

## 2021-01-01 RX ADMIN — Medication 10 ML: at 21:27

## 2021-01-01 RX ADMIN — GLYCOPYRROLATE 0.4 MG: 0.2 INJECTION INTRAMUSCULAR; INTRAVENOUS at 17:03

## 2021-01-01 RX ADMIN — HYDROCODONE BITARTRATE AND ACETAMINOPHEN 1 TABLET: 5; 325 TABLET ORAL at 08:57

## 2021-01-01 RX ADMIN — ENOXAPARIN SODIUM 40 MG: 100 INJECTION SUBCUTANEOUS at 08:01

## 2021-01-01 RX ADMIN — SODIUM CHLORIDE, PRESERVATIVE FREE 10 ML: 5 INJECTION INTRAVENOUS at 08:01

## 2021-01-01 RX ADMIN — ATORVASTATIN CALCIUM 40 MG: 40 TABLET, FILM COATED ORAL at 20:38

## 2021-01-01 RX ADMIN — ACETAMINOPHEN 650 MG: 325 TABLET ORAL at 20:38

## 2021-01-01 RX ADMIN — CARVEDILOL 12.5 MG: 6.25 TABLET, FILM COATED ORAL at 17:18

## 2021-01-01 RX ADMIN — INSULIN LISPRO 1 UNITS: 100 INJECTION, SOLUTION INTRAVENOUS; SUBCUTANEOUS at 21:34

## 2021-01-01 RX ADMIN — HYDROCODONE BITARTRATE AND ACETAMINOPHEN 1 TABLET: 5; 325 TABLET ORAL at 08:02

## 2021-01-01 RX ADMIN — ENOXAPARIN SODIUM 40 MG: 100 INJECTION SUBCUTANEOUS at 08:45

## 2021-01-01 RX ADMIN — HYDROCODONE BITARTRATE AND ACETAMINOPHEN 1 TABLET: 5; 325 TABLET ORAL at 16:49

## 2021-01-01 RX ADMIN — VANCOMYCIN HYDROCHLORIDE 1000 MG: 1 INJECTION, POWDER, LYOPHILIZED, FOR SOLUTION INTRAVENOUS at 08:56

## 2021-01-01 RX ADMIN — SODIUM CHLORIDE: 9 INJECTION, SOLUTION INTRAVENOUS at 08:02

## 2021-01-01 RX ADMIN — HYDROCODONE BITARTRATE AND ACETAMINOPHEN 1 TABLET: 5; 325 TABLET ORAL at 23:13

## 2021-01-01 RX ADMIN — INSULIN LISPRO 4 UNITS: 100 INJECTION, SOLUTION INTRAVENOUS; SUBCUTANEOUS at 16:49

## 2021-01-01 RX ADMIN — SODIUM CHLORIDE, PRESERVATIVE FREE 10 ML: 5 INJECTION INTRAVENOUS at 08:58

## 2021-01-01 RX ADMIN — ONDANSETRON 4 MG: 2 INJECTION INTRAMUSCULAR; INTRAVENOUS at 17:51

## 2021-01-01 RX ADMIN — PANTOPRAZOLE SODIUM 40 MG: 40 INJECTION, POWDER, FOR SOLUTION INTRAVENOUS at 21:26

## 2021-01-01 RX ADMIN — SODIUM CHLORIDE, PRESERVATIVE FREE 10 ML: 5 INJECTION INTRAVENOUS at 08:02

## 2021-01-01 RX ADMIN — PANTOPRAZOLE SODIUM 40 MG: 40 INJECTION, POWDER, FOR SOLUTION INTRAVENOUS at 08:57

## 2021-01-01 RX ADMIN — VANCOMYCIN HYDROCHLORIDE 2000 MG: 10 INJECTION, POWDER, LYOPHILIZED, FOR SOLUTION INTRAVENOUS at 14:11

## 2021-01-01 RX ADMIN — NOREPINEPHRINE BITARTRATE 15 MCG/MIN: 1 INJECTION, SOLUTION, CONCENTRATE INTRAVENOUS at 12:00

## 2021-01-01 RX ADMIN — CARVEDILOL 12.5 MG: 6.25 TABLET, FILM COATED ORAL at 08:45

## 2021-01-01 RX ADMIN — SODIUM CHLORIDE: 9 INJECTION, SOLUTION INTRAVENOUS at 14:07

## 2021-01-01 RX ADMIN — PANTOPRAZOLE SODIUM 40 MG: 40 INJECTION, POWDER, FOR SOLUTION INTRAVENOUS at 13:36

## 2021-01-01 RX ADMIN — HYDROCODONE BITARTRATE AND ACETAMINOPHEN 1 TABLET: 5; 325 TABLET ORAL at 08:06

## 2021-01-01 RX ADMIN — INSULIN LISPRO 4 UNITS: 100 INJECTION, SOLUTION INTRAVENOUS; SUBCUTANEOUS at 13:59

## 2021-01-01 RX ADMIN — PANTOPRAZOLE SODIUM 40 MG: 40 INJECTION, POWDER, FOR SOLUTION INTRAVENOUS at 21:32

## 2021-01-01 RX ADMIN — Medication 10 ML: at 08:58

## 2021-01-01 RX ADMIN — INSULIN LISPRO 2 UNITS: 100 INJECTION, SOLUTION INTRAVENOUS; SUBCUTANEOUS at 17:18

## 2021-01-01 RX ADMIN — IOPAMIDOL 75 ML: 755 INJECTION, SOLUTION INTRAVENOUS at 13:39

## 2021-01-01 RX ADMIN — INSULIN LISPRO 2 UNITS: 100 INJECTION, SOLUTION INTRAVENOUS; SUBCUTANEOUS at 08:05

## 2021-01-01 RX ADMIN — ATORVASTATIN CALCIUM 40 MG: 40 TABLET, FILM COATED ORAL at 21:32

## 2021-01-01 RX ADMIN — SODIUM CHLORIDE, PRESERVATIVE FREE 10 ML: 5 INJECTION INTRAVENOUS at 21:32

## 2021-01-01 RX ADMIN — CARVEDILOL 6.25 MG: 6.25 TABLET, FILM COATED ORAL at 13:09

## 2021-01-01 RX ADMIN — INSULIN LISPRO 1 UNITS: 100 INJECTION, SOLUTION INTRAVENOUS; SUBCUTANEOUS at 23:09

## 2021-01-01 RX ADMIN — Medication 10 ML: at 08:01

## 2021-01-01 RX ADMIN — LORAZEPAM 1 MG: 2 INJECTION INTRAMUSCULAR; INTRAVENOUS at 17:29

## 2021-01-01 RX ADMIN — INSULIN LISPRO 2 UNITS: 100 INJECTION, SOLUTION INTRAVENOUS; SUBCUTANEOUS at 20:46

## 2021-01-01 RX ADMIN — SODIUM CHLORIDE, PRESERVATIVE FREE 10 ML: 5 INJECTION INTRAVENOUS at 13:38

## 2021-01-01 RX ADMIN — ENOXAPARIN SODIUM 40 MG: 100 INJECTION SUBCUTANEOUS at 08:06

## 2021-01-01 RX ADMIN — ASPIRIN 325 MG ORAL TABLET 325 MG: 325 PILL ORAL at 16:12

## 2021-01-01 RX ADMIN — PANTOPRAZOLE SODIUM 40 MG: 40 INJECTION, POWDER, FOR SOLUTION INTRAVENOUS at 08:01

## 2021-01-01 RX ADMIN — ATORVASTATIN CALCIUM 40 MG: 40 TABLET, FILM COATED ORAL at 21:34

## 2021-01-01 RX ADMIN — PANTOPRAZOLE SODIUM 40 MG: 40 INJECTION, POWDER, FOR SOLUTION INTRAVENOUS at 20:39

## 2021-01-01 RX ADMIN — ONDANSETRON 4 MG: 2 INJECTION INTRAMUSCULAR; INTRAVENOUS at 12:04

## 2021-01-01 RX ADMIN — ENOXAPARIN SODIUM 40 MG: 100 INJECTION SUBCUTANEOUS at 14:03

## 2021-01-01 RX ADMIN — SODIUM CHLORIDE: 9 INJECTION, SOLUTION INTRAVENOUS at 23:59

## 2021-01-01 RX ADMIN — HYDROCODONE BITARTRATE AND ACETAMINOPHEN 1 TABLET: 5; 325 TABLET ORAL at 23:59

## 2021-01-01 RX ADMIN — ATORVASTATIN CALCIUM 40 MG: 40 TABLET, FILM COATED ORAL at 21:35

## 2021-01-01 RX ADMIN — INSULIN LISPRO 4 UNITS: 100 INJECTION, SOLUTION INTRAVENOUS; SUBCUTANEOUS at 11:33

## 2021-01-01 RX ADMIN — Medication 10 ML: at 20:44

## 2021-01-01 RX ADMIN — SODIUM CHLORIDE: 9 INJECTION, SOLUTION INTRAVENOUS at 23:50

## 2021-01-01 RX ADMIN — HYDROCODONE BITARTRATE AND ACETAMINOPHEN 1 TABLET: 5; 325 TABLET ORAL at 14:07

## 2021-01-01 RX ADMIN — SODIUM CHLORIDE: 9 INJECTION, SOLUTION INTRAVENOUS at 05:56

## 2021-01-01 RX ADMIN — MORPHINE SULFATE 2 MG: 2 INJECTION, SOLUTION INTRAMUSCULAR; INTRAVENOUS at 17:44

## 2021-01-01 RX ADMIN — Medication 10 ML: at 22:23

## 2021-01-01 RX ADMIN — SODIUM CHLORIDE: 9 INJECTION, SOLUTION INTRAVENOUS at 22:13

## 2021-01-01 RX ADMIN — CARVEDILOL 6.25 MG: 6.25 TABLET, FILM COATED ORAL at 08:02

## 2021-01-01 RX ADMIN — SODIUM CHLORIDE, PRESERVATIVE FREE 10 ML: 5 INJECTION INTRAVENOUS at 10:51

## 2021-01-01 RX ADMIN — SODIUM CHLORIDE, PRESERVATIVE FREE 10 ML: 5 INJECTION INTRAVENOUS at 22:22

## 2021-01-01 RX ADMIN — CARVEDILOL 12.5 MG: 6.25 TABLET, FILM COATED ORAL at 17:00

## 2021-01-01 RX ADMIN — CARVEDILOL 12.5 MG: 6.25 TABLET, FILM COATED ORAL at 08:57

## 2021-01-01 RX ADMIN — SODIUM CHLORIDE, PRESERVATIVE FREE 10 ML: 5 INJECTION INTRAVENOUS at 21:26

## 2021-01-01 RX ADMIN — INSULIN LISPRO 1 UNITS: 100 INJECTION, SOLUTION INTRAVENOUS; SUBCUTANEOUS at 21:44

## 2021-01-01 RX ADMIN — ACETAMINOPHEN 650 MG: 325 TABLET ORAL at 22:13

## 2021-01-01 RX ADMIN — INSULIN LISPRO 2 UNITS: 100 INJECTION, SOLUTION INTRAVENOUS; SUBCUTANEOUS at 23:13

## 2021-01-01 ASSESSMENT — ENCOUNTER SYMPTOMS
WHEEZING: 0
SHORTNESS OF BREATH: 0
VOICE CHANGE: 0
COUGH: 0
DIARRHEA: 0
ABDOMINAL PAIN: 1
PHOTOPHOBIA: 0
EYE DISCHARGE: 0
TROUBLE SWALLOWING: 0
RHINORRHEA: 0
VOMITING: 0
EYE REDNESS: 0
BACK PAIN: 1
NAUSEA: 1
BLOOD IN STOOL: 0

## 2021-01-01 ASSESSMENT — PULMONARY FUNCTION TESTS
PIF_VALUE: 32
PIF_VALUE: 30
PIF_VALUE: 29
PIF_VALUE: 31
PIF_VALUE: 31
PIF_VALUE: 28
PIF_VALUE: 29
PIF_VALUE: 28
PIF_VALUE: 28
PIF_VALUE: 29
PIF_VALUE: 31
PIF_VALUE: 34
PIF_VALUE: 28
PIF_VALUE: 29
PIF_VALUE: 29
PIF_VALUE: 32

## 2021-01-01 ASSESSMENT — PAIN SCALES - GENERAL
PAINLEVEL_OUTOF10: 8
PAINLEVEL_OUTOF10: 10
PAINLEVEL_OUTOF10: 0
PAINLEVEL_OUTOF10: 8
PAINLEVEL_OUTOF10: 9
PAINLEVEL_OUTOF10: 0
PAINLEVEL_OUTOF10: 8
PAINLEVEL_OUTOF10: 10
PAINLEVEL_OUTOF10: 9
PAINLEVEL_OUTOF10: 9
PAINLEVEL_OUTOF10: 0
PAINLEVEL_OUTOF10: 8
PAINLEVEL_OUTOF10: 8
PAINLEVEL_OUTOF10: 9
PAINLEVEL_OUTOF10: 5
PAINLEVEL_OUTOF10: 10
PAINLEVEL_OUTOF10: 9
PAINLEVEL_OUTOF10: 10
PAINLEVEL_OUTOF10: 9
PAINLEVEL_OUTOF10: 8

## 2021-01-01 ASSESSMENT — PAIN DESCRIPTION - LOCATION
LOCATION: BACK

## 2021-01-01 ASSESSMENT — PAIN DESCRIPTION - PAIN TYPE
TYPE: CHRONIC PAIN

## 2021-01-01 ASSESSMENT — LIFESTYLE VARIABLES: SMOKING_STATUS: 0

## 2021-11-19 PROBLEM — R77.8 ELEVATED TROPONIN: Status: ACTIVE | Noted: 2021-01-01

## 2021-11-19 NOTE — ED NOTES
Bed: H3  Expected date:   Expected time:   Means of arrival:   Comments:  Pt in 416 JUAN Elizabeth RN  11/19/21 4339

## 2021-11-19 NOTE — ED NOTES
Bed: 06  Expected date:   Expected time:   Means of arrival:   Comments:  213 Elva Caba RN  11/19/21 [id number]

## 2021-11-19 NOTE — H&P
[address] [place], [place] [address]ist Group   History and Physical      CHIEF COMPLAINT:  Frequent falls    History of Present Illness:  79 y.o. female with a history of DM and HTN (noncompliant, not on any medications) presents with frequent falls and abdominal and back pain. The patient states that she had 3 mechanical falls in the past few days. She denies hitting her head or loosing consciousness during any of these falls. She states that she had back pain prior to falling but the fall made it worse and it is in the lower back and it radiates to her abdomen. She states her entire abdomen hurts. She can not classify the pain further. She also has N/V associated with the abdominal pain. She denies fever, chills, diarrhea, CP, SOB or diaphoresis. She states that she has not left the house in many years and has not been to a PCP in over 10-15 years. She states that she picks at her feet because they are dry and did not realize how bad they had gotten. Informant(s) for H&P:Patient, patient's son, patient's , ER     REVIEW OF SYSTEMS:  no fevers, chills, cp, sob, n/v, ha, vision/hearing changes, wt changes, hot/cold flashes, other open skin lesions, diarrhea, constipation, dysuria/hematuria unless noted in HPI. Complete ROS performed with the patient and is otherwise negative. PMH:  Past Medical History:   Diagnosis Date    Back pain     Eczema        Surgical History:  History reviewed. No pertinent surgical history. Medications Prior to Admission:    Prior to Admission medications    Medication Sig Start Date End Date Taking? Authorizing Provider   ibuprofen (ADVIL;MOTRIN) 200 MG tablet Take 200 mg by mouth every 6 hours as needed for Pain   Yes Historical Provider, MD   permethrin (ELIMITE) 5 % cream APPLY HEAD TO TOE AVOID EYES MOUTH NOSE  WASH OFF AFTER 8 HOURS  DISPENSE 30GMS  NO REFILLS 3/4/20   Marv Ramos DO       Allergies:    Patient has no known allergies.     Social History:    reports that she has never smoked. She has never used smokeless tobacco. She reports that she does not drink alcohol and does not use drugs. Family History:   family history is not on file. PHYSICAL EXAM:  Vitals:  BP (!) 149/77   Pulse 102   Temp 99.4 °F (37.4 °C) (Oral)   Resp 18   Ht 5' 7.5\" (1.715 m)   Wt 300 lb (136.1 kg)   SpO2 93%   BMI 46.29 kg/m²     General Appearance: alert and oriented to person, place and time and in no acute distress  Skin: warm and dry, b/l LE stasis dermatitis, b/l 1st and 2nd toes with chronic wounds with dried blood and small amount of fresh blood and parts of toes missing  Head: normocephalic and atraumatic  Eyes: pupils equal, round, and reactive to light, extraocular eye movements intact, conjunctivae normal  Neck: neck supple and non tender without mass   Pulmonary/Chest: distant lung sounds due to body habitus, inability to sit up or turn and poor inspiratory effort, no respiratory distress  Cardiovascular: normal rate, normal S1 and S2 and no carotid bruits  Abdomen: soft, mild tenderness throughout, non-distended, normal bowel sounds, no masses or organomegaly  Extremities: no cyanosis, no clubbing and b/l LE edema  Neurologic: no cranial nerve deficit and speech normal    LABS:  Recent Labs     11/19/21  1056      K 3.7   CL 98   CO2 22   BUN 14   CREATININE 0.7   GLUCOSE 252*   CALCIUM 8.6       Recent Labs     11/19/21  1056   WBC 14.8*   RBC 4.30   HGB 12.2   HCT 37.5   MCV 87.2   MCH 28.4   MCHC 32.5   RDW 13.4      MPV 9.9       No results for input(s): POCGLU in the last 72 hours. Troponin [[id number]] (Abnormal)    Collected: 11/19/21 1619    Updated: 11/19/21 1706    Specimen Source: Blood     Troponin, High Sensitivity 112 High  ng/L    Comment: High Sensitivity Troponin values cannot be compared with   other Troponin methodologies.      Patients with high levels of Biotin oral intake (i.e. >5 mg/day)   may have falsely decreased Troponin levels. Samples collected   within 8 hours of biotin intake may require additional information   for diagnosis. Troponin [[id number]] (Abnormal)    Collected: 11/19/21 1405    Updated: 11/19/21 1442    Specimen Source: Blood     Troponin, High Sensitivity 83 High  ng/L    Comment: High Sensitivity Troponin values cannot be compared with   other Troponin methodologies. Troponin [[id number]] (Abnormal)    Collected: 11/19/21 1056    Updated: 11/19/21 1203    Specimen Source: Blood     Troponin, High Sensitivity 58 High  ng/L    Comment: High Sensitivity Troponin values cannot be compared with   other Troponin methodologies. Lipase [[id number]] (Abnormal)    Collected: 11/19/21 1056    Updated: 11/19/21 1203    Specimen Source: Blood     Lipase 10 Low  U/L           Radiology: CT Head WO Contrast    Result Date: 11/19/2021  EXAMINATION: CT OF THE HEAD WITHOUT CONTRAST  11/19/2021 1:21 pm TECHNIQUE: CT of the head was performed without the administration of intravenous contrast. Dose modulation, iterative reconstruction, and/or weight based adjustment of the mA/kV was utilized to reduce the radiation dose to as low as reasonably achievable. COMPARISON: None. HISTORY: ORDERING SYSTEM PROVIDED HISTORY: fall TECHNOLOGIST PROVIDED HISTORY: Reason for exam:->fall Has a \"code stroke\" or \"stroke alert\" been called? ->No Decision Support Exception - unselect if not a suspected or confirmed emergency medical condition->Emergency Medical Condition (MA) FINDINGS: The ventricles are normal size, position and contour. There are no masses or mass effect. There are no parenchymal hemorrhages or extra-axial fluid collections. No definite acute CVA. The cerebellum and brainstem are normal.  There is mucosal thickening of the maxillary sinuses. The mastoid air cells are clear.   Osseous calvarium is normal.  There is a small area of increased density posterior parietal/occipital scalp just right of midline. No acute intracranial abnormality. Small right posterior parietal/occipital scalp contusion. CT CHEST WO CONTRAST    Result Date: 11/19/2021  EXAMINATION: CT OF THE CHEST WITHOUT CONTRAST; CT OF THE ABDOMEN AND PELVIS WITH CONTRAST 11/19/2021 10:21 am TECHNIQUE: CT of the chest was performed without the administration of intravenous contrast. Multiplanar reformatted images are provided for review. Dose modulation, iterative reconstruction, and/or weight based adjustment of the mA/kV was utilized to reduce the radiation dose to as low as reasonably achievable.; CT of the abdomen and pelvis was performed with the administration of intravenous contrast. Multiplanar reformatted images are provided for review. Dose modulation, iterative reconstruction, and/or weight based adjustment of the mA/kV was utilized to reduce the radiation dose to as low as reasonably achievable. COMPARISON: None. HISTORY: ORDERING SYSTEM PROVIDED HISTORY: rib pain TECHNOLOGIST PROVIDED HISTORY: Reason for exam:->rib pain Decision Support Exception - unselect if not a suspected or confirmed emergency medical condition->Emergency Medical Condition (MA) FINDINGS: Limited noncontrast technique in the setting of trauma. Allowing for this limitation, findings are as follows: CHEST: Left thyroid nodule measuring 2.6 cm. Enlarged left thyroid lobe. No evidence of mediastinal hematoma. Enlarged main pulmonary artery measuring 36 mm. Coronary atherosclerosis. No pericardial effusion. No pleural effusion or pneumothorax. Interlobular septal thickening (smooth) in both lungs. Mild dependent atelectasis in the upper lungs predominantly. Fullness of the bilateral roslyn. The central airways are patent. Findings of ankylosing spondylitis. Degenerative changes of the right shoulder. Old appearing bilateral anterolateral rib fractures and old appearing right posterior rib fractures.   No acute appearing or displaced rib fractures are evident. ABDOMEN/PELVIS: Curvilinear artifact projecting over the left lateral abdomen/pelvis, possibly a detector related artifact. No free air or significant free fluid. No evidence of bowel obstruction. Nonvisualized appendix, however there are no right lower quadrant inflammatory changes to suggest appendicitis. Small hiatal hernia. No acute traumatic abnormality of the liver, pancreas, spleen, adrenal glands, or kidneys. Status post cholecystectomy. No unexpected biliary dilatation. No evidence of hydronephrosis or pyelonephritis. Subcentimeter low-density foci in the kidneys are too small to accurately characterize. Unremarkable appearance of the bladder and uterus. Left adnexal calcification. No abdominal aortic aneurysm or evidence of dissection. Atherosclerotic disease noted. Skin thickening in the lower anterior abdominal wall, without soft tissue gas or abscess. Fracture of the L2 vertebral body involving the anterior column and transversely oriented in the midportion of the vertebral body. No significant vertebral height loss. Findings suggestive ankylosing spondylitis again noted. CHEST: No acute traumatic abnormality is evident in the chest. Left thyroid nodule. See recommendation below. Enlarged main pulmonary artery, which can be seen in the setting of pulmonary arterial hypertension. Mild cardiomegaly. Findings suggestive of pulmonary vascular congestion. Findings suggestive of ankylosing spondylitis. ABDOMEN/PELVIS: Fracture of L2 vertebral body involving the anterior column. No significant height loss or retropulsion. No evidence of solid or hollow organ injury. Nonspecific skin thickening in the lower anterior abdominal wall which may represent cellulitis or simple body wall edema. No evidence of abscess. Small hiatal hernia. Atherosclerotic disease. Status post cholecystectomy. RECOMMENDATIONS: 2.6 cm incidental left thyroid nodule. Recommend thyroid US.  Reference: J Am Rony Radiol. 2015 Feb;12(2): 143-50     CT Cervical Spine WO Contrast    Result Date: 11/19/2021  EXAMINATION: CT OF THE CERVICAL SPINE WITHOUT CONTRAST 11/19/2021 1:21 pm TECHNIQUE: CT of the cervical spine was performed without the administration of intravenous contrast. Multiplanar reformatted images are provided for review. Dose modulation, iterative reconstruction, and/or weight based adjustment of the mA/kV was utilized to reduce the radiation dose to as low as reasonably achievable. COMPARISON: None. HISTORY: ORDERING SYSTEM PROVIDED HISTORY: fall TECHNOLOGIST PROVIDED HISTORY: Reason for exam:->fall Decision Support Exception - unselect if not a suspected or confirmed emergency medical condition->Emergency Medical Condition (MA) FINDINGS: BONES/ALIGNMENT: There is no acute fracture or traumatic malalignment. DEGENERATIVE CHANGES: Degenerative disc disease most prominent at C5-C6 and C6-C7 with uncovertebral arthropathy. There is multilevel facet arthropathy seen throughout the cervical spine. SOFT TISSUES: There is no prevertebral soft tissue swelling. There is a 2.0 cm left thyroid nodule. Partially visualized patchy ground-glass opacity identified in right lung apex. No traumatic injuries in cervical spine. Focal 2 cm left thyroid nodule. Follow-up evaluation with outpatient thyroid ultrasound may be obtained if for further evaluation. Ground-glass opacity in right lung apex. Follow-up separate CT chest report for further details. CT ABDOMEN PELVIS W IV CONTRAST Additional Contrast? None    Result Date: 11/19/2021  EXAMINATION: CT OF THE CHEST WITHOUT CONTRAST; CT OF THE ABDOMEN AND PELVIS WITH CONTRAST 11/19/2021 10:21 am TECHNIQUE: CT of the chest was performed without the administration of intravenous contrast. Multiplanar reformatted images are provided for review.  Dose modulation, iterative reconstruction, and/or weight based adjustment of the mA/kV was utilized to reduce the radiation dose to as low as reasonably achievable.; CT of the abdomen and pelvis was performed with the administration of intravenous contrast. Multiplanar reformatted images are provided for review. Dose modulation, iterative reconstruction, and/or weight based adjustment of the mA/kV was utilized to reduce the radiation dose to as low as reasonably achievable. COMPARISON: None. HISTORY: ORDERING SYSTEM PROVIDED HISTORY: rib pain TECHNOLOGIST PROVIDED HISTORY: Reason for exam:->rib pain Decision Support Exception - unselect if not a suspected or confirmed emergency medical condition->Emergency Medical Condition (MA) FINDINGS: Limited noncontrast technique in the setting of trauma. Allowing for this limitation, findings are as follows: CHEST: Left thyroid nodule measuring 2.6 cm. Enlarged left thyroid lobe. No evidence of mediastinal hematoma. Enlarged main pulmonary artery measuring 36 mm. Coronary atherosclerosis. No pericardial effusion. No pleural effusion or pneumothorax. Interlobular septal thickening (smooth) in both lungs. Mild dependent atelectasis in the upper lungs predominantly. Fullness of the bilateral roslyn. The central airways are patent. Findings of ankylosing spondylitis. Degenerative changes of the right shoulder. Old appearing bilateral anterolateral rib fractures and old appearing right posterior rib fractures. No acute appearing or displaced rib fractures are evident. ABDOMEN/PELVIS: Curvilinear artifact projecting over the left lateral abdomen/pelvis, possibly a detector related artifact. No free air or significant free fluid. No evidence of bowel obstruction. Nonvisualized appendix, however there are no right lower quadrant inflammatory changes to suggest appendicitis. Small hiatal hernia. No acute traumatic abnormality of the liver, pancreas, spleen, adrenal glands, or kidneys. Status post cholecystectomy. No unexpected biliary dilatation. No evidence of hydronephrosis or pyelonephritis. Subcentimeter low-density foci in the kidneys are too small to accurately characterize. Unremarkable appearance of the bladder and uterus. Left adnexal calcification. No abdominal aortic aneurysm or evidence of dissection. Atherosclerotic disease noted. Skin thickening in the lower anterior abdominal wall, without soft tissue gas or abscess. Fracture of the L2 vertebral body involving the anterior column and transversely oriented in the midportion of the vertebral body. No significant vertebral height loss. Findings suggestive ankylosing spondylitis again noted. CHEST: No acute traumatic abnormality is evident in the chest. Left thyroid nodule. See recommendation below. Enlarged main pulmonary artery, which can be seen in the setting of pulmonary arterial hypertension. Mild cardiomegaly. Findings suggestive of pulmonary vascular congestion. Findings suggestive of ankylosing spondylitis. ABDOMEN/PELVIS: Fracture of L2 vertebral body involving the anterior column. No significant height loss or retropulsion. No evidence of solid or hollow organ injury. Nonspecific skin thickening in the lower anterior abdominal wall which may represent cellulitis or simple body wall edema. No evidence of abscess. Small hiatal hernia. Atherosclerotic disease. Status post cholecystectomy. RECOMMENDATIONS: 2.6 cm incidental left thyroid nodule. Recommend thyroid US. Reference: J Am Rony Radiol. 2015 Feb;12(2): 143-50       EKG: sinus tachycardia, nonspecific ST changes, poor R wave progression    ASSESSMENT:      Active Problems:    Elevated troponin  Resolved Problems:    * No resolved hospital problems. *      PLAN:    1. Elevated troponins, possible NSTEMI  -Troponin trend: 58--> 83 --> 112  -EKG with sinus tach and nonspecific ST changes  -Patient did not complain of any CP or SOB  -Cardiology made aware by ER physician, stated will see patient in AM  -Monitor on telemetry and await cardio recs.  Will make NPO midnight and caffeine restriction for possible stress in AM    2. N/V and abdominal pain  -Started after fall, states it is pain radiated from back  -Lipase negative, LFTs and lactic acid WNL, CT abdomen/pelvis not showing any acute findings (however patient did have cholecystectomy previously)  -Will check abdominal US, however if patient does have DKA, may be related to that     3. Frequent falls  -Patient has become weaker and has fallen multiple times (no LOC or head trauma) - family and patient interested in inpatient rehab  -Will consult PT/OT and SW already gave patient options for [facility]  -FU patient's progress    4. DM, uncontrolled, untreated, possible DKA  -Patient stated she had a history of DM, however has not been to a PCP in over 10-15 years so has not been on any medications  -Glucose on admission 252, anion gap 14 and patient had ketones and glucose in urine  -Will check beta-hydroxybutyrate and venous pH to assess for DKA  -Start patient on medium ISS for now, will need to be started on insulin drip and DKA protocol if pH is acidotic    5. Bilateral foot wounds  -Patient with significant b/l foot wounds with part of toes missing which patient states is from picking at the skin  -Will consult podiatry and wound care nurse, get blood and wound cultures  -FU recommendations        Code Status: Full (discussed with patient and she stated she would be ok with short term intubation but does not want to be on any long term life saving measures)  DVT prophylaxis: Lovenox    NOTE: This report was transcribed using voice recognition software.  Every effort was made to ensure accuracy; however, inadvertent computerized transcription errors may be present.     Electronically signed by Herlinda Meredith MD on 11/19/2021 at 5:40 PM

## 2021-11-19 NOTE — CARE COORDINATION
SS Note: Covid test negative. Pt presented for fall and has wounds on lower legs and toes. Pt's family interested in placement. CT abdomen notes Fracture of L2 vertebral body and CT chest notes Ground-glass opacity in right lung. Shantel Collado, DO- Resident noted pt to be admitted; troponin has steadily gone up (58, 83, 112) and consult to cardiology pending. SW met w/pt in ED 06 and explained role. Pt's spouse and son @ bedside and pt gives permission to speak openly. Pt & spouse live in [facility] on 2nd floor last 25 yrs. Pt typically uses walker but has not been doing too good last week. Also, she has not left the apartment for @ least 3 yrs as she cannot go down the stairs. Her  still drives and helps out as much as possible. Pt does not have PCP & takes no medication. Denies hx of HHC or [facility] & No hx of 02. SW discussed need for therapy for pt and likelihood for d/c to [facility]. Pt's son inquired about AL option but pt and family did not realize there would be such a high cost associated with AL; if the individual does not have a waiver. They were looking into senior housing and pt's  notes they cannot afford that. There is concern for pt returning to the apartment, especially getting up the 2 flights of steps. Pt's son explains it was quite a task for fire dept to get pt down the steps today. SW again stressed pt's priority after pt is medically stable is [facility] for rehab. SW provided the following resources:  1) [facility] list- need to pick top 3 choices. 2) AL list  3) [facility]  -possibly can get Waiver   4) Care patrol  5) Visiting Physicians  6) [place] [id number] agency list  7) PCP list  Sw also completed ACP w/pt. She requests to be DNI. Sticky note placed to Physician.    Electronically signed by NAREN Munson on 11/19/2021 at 6:14 PM

## 2021-11-19 NOTE — ED NOTES
Bed: 06  Expected date:   Expected time:   Means of arrival:   Comments:  kush Curry RN  11/19/21 0982

## 2021-11-19 NOTE — ED NOTES
Pt presents with wounds on great toe of left foot and great toe and second toe on right foot.       Emmy Urbina RN  11/19/21 1002

## 2021-11-19 NOTE — ED PROVIDER NOTES
79year old female presenting to the emergency department after 3 falls this morning. Patient states that she had no head trauma, first fall was leaving from the bathroom, landed on her back. Second fall patient was asleep and woke up on the floor on her back. ,  Third fall was when patient was getting to the bathroom, fell and her back. Patient states that her legs became weak and gave out from under her. Patient denies any syncope, dizziness, numbness, weakness currently. Patient has back pain, and reports she has additional abdominal pain which onset after her falls. Patient is bedbound and housebound and has not seen a healthcare provider for some time. Patient has wounds on bilateral feet with extensive plaque-like rashes noted on bilateral legs on up to mid shin. Review of Systems   Constitutional: Negative for chills, fatigue and fever. HENT: Negative for congestion, rhinorrhea, trouble swallowing and voice change. Eyes: Negative for photophobia, discharge, redness and visual disturbance. Respiratory: Negative for cough, shortness of breath and wheezing. Cardiovascular: Negative for chest pain and leg swelling. Gastrointestinal: Positive for abdominal pain and nausea. Negative for blood in stool, diarrhea and vomiting. Genitourinary: Positive for frequency and urgency. Negative for hematuria. Musculoskeletal: Positive for back pain. Negative for neck pain and neck stiffness. Skin: Positive for rash and wound (Bilateral feet). Neurological: Positive for weakness. Negative for dizziness, syncope, facial asymmetry and numbness. Psychiatric/Behavioral: Negative for behavioral problems and confusion. Physical Exam  Vitals reviewed. Constitutional:       General: She is not in acute distress. Appearance: She is normal weight. She is not ill-appearing. HENT:      Head: Normocephalic.       Right Ear: External ear normal.      Left Ear: External ear normal. Nose: Nose normal. No congestion or rhinorrhea. Mouth/Throat:      Mouth: Mucous membranes are moist.   Eyes:      General:         Right eye: No discharge. Left eye: No discharge. Conjunctiva/sclera: Conjunctivae normal.   Cardiovascular:      Rate and Rhythm: Regular rhythm. Tachycardia present. Pulses: Normal pulses. Heart sounds: No murmur heard. Pulmonary:      Effort: Pulmonary effort is normal. No respiratory distress. Breath sounds: Normal breath sounds. No wheezing, rhonchi or rales. Abdominal:      General: Abdomen is flat. There is no distension. Palpations: Abdomen is soft. Tenderness: There is abdominal tenderness. There is no guarding or rebound. Musculoskeletal:         General: No swelling or tenderness. Normal range of motion. Cervical back: Normal range of motion. No rigidity or tenderness. Skin:     General: Skin is warm. Capillary Refill: Capillary refill takes less than 2 seconds. Findings: Erythema and rash (Extensive plaque like rash noted on bilateral lower extremities, with associated erythema. From ankle to mid shin) present. Neurological:      Mental Status: She is alert and oriented to person, place, and time. GCS: GCS eye subscore is 4. GCS verbal subscore is 5. GCS motor subscore is 6. Cranial Nerves: No dysarthria or facial asymmetry. Motor: No weakness. Coordination: Coordination normal. Finger-Nose-Finger Test normal.   Psychiatric:         Attention and Perception: Attention normal.         Mood and Affect: Mood is anxious. Behavior: Behavior normal. Behavior is cooperative. Procedures     MDM  Number of Diagnoses or Management Options  Diagnosis management comments: 80-year-old female presenting emergency department after 3 falls this morning prior to arrival.  Patient has not seen a provider for some time.   Patient states she is bedbound and unable to go see providers at home, with bilateral rashes on lower extremities with wounds on bilateral toes scratch brought by patient. Patient lives at home with . Glucose 500 ketones greater than 80, large amount of blood 30 protein. CT head without contrast showed no acute intracranial abnormality with small right posterior parietal occipital scalp contusion, CT cervical spine without contrast showed no acute traumatic injury in cervical spine focal 2 cm left thyroid nodule groundglass opacity in right lung apex. CTs showed no acute traumatic abnormality, left thyroid nodule, enlarged main pulmonary artery which may indicate pulmonary arterial hypertension, mild cardiomegaly, pulmonary vascular congestion, ankylosing spondylitis, fracture of L2 vertebral body involving the anterior column no significant height loss or retropulsion small hiatal hernia, nonspecific skin thickening the lower anterior abdominal wall which measures over the cellulitis or simple wall edema with no evidence of abscess, atherosclerotic disease. Patient given A aspirin, morphine, Zofran. Repeat troponin was elevated at 30 points, put a consult with a call radiology and discussed with Dr. Marilou Villegas who indicated as patient does not have any ischemia or anginal symptoms, previously given more aspirin and morphine are appropriate and will hold off heparin at this time with cardiology agreed to consult on patient when inpatient tomorrow. Spoke to Dr. Brissa Aldana who is agreed to admit patient.        Amount and/or Complexity of Data Reviewed  Decide to obtain previous medical records or to obtain history from someone other than the patient: yes         ED Course as of 11/19/21 1930 Fri Nov 19, 2021   1640 Dr. Dr. Luiz Morrow discussed the patient Dr. Luiz Morrow indicated that as patient does not have any ischemia or anginal symptoms, previously given aspirin and morphine are appropriate will hold off on heparin at this time and  Cardiology has agreed to consult on patient while she is inpatient tomorrow [ASIF]      ED Course User Index  [ASIF] Gemma Curtis DO        ED Course as of 11/19/21 1930 Fri Nov 19, 2021   1640 Dr. Dr. Consuella Claude discussed the patient Dr. Consuella Claude indicated that as patient does not have any ischemia or anginal symptoms, previously given aspirin and morphine are appropriate will hold off on heparin at this time and  Cardiology has agreed to consult on patient while she is inpatient tomorrow [ASIF]      ED Course User Index  [ASIF] Gemma Curtis DO       --------------------------------------------- PAST HISTORY ---------------------------------------------  Past Medical History:  has a past medical history of Back pain and Eczema. Past Surgical History:  has no past surgical history on file. Social History:  reports that she has never smoked. She has never used smokeless tobacco. She reports that she does not drink alcohol and does not use drugs. Family History: family history is not on file. The patients home medications have been reviewed. Allergies: Patient has no known allergies.     -------------------------------------------------- RESULTS -------------------------------------------------    LABS:  Results for orders placed or performed during the hospital encounter of 11/19/21   COVID-19, Rapid    Specimen: Nasopharyngeal Swab   Result Value Ref Range    SARS-CoV-2, NAAT Not Detected Not Detected   CBC Auto Differential   Result Value Ref Range    WBC 14.8 (H) 4.5 - 11.5 E9/L    RBC 4.30 3.50 - 5.50 E12/L    Hemoglobin 12.2 11.5 - 15.5 g/dL    Hematocrit 37.5 34.0 - 48.0 %    MCV 87.2 80.0 - 99.9 fL    MCH 28.4 26.0 - 35.0 pg    MCHC 32.5 32.0 - 34.5 %    RDW 13.4 11.5 - 15.0 fL    Platelets 766 983 - 464 E9/L    MPV 9.9 7.0 - 12.0 fL    Neutrophils % 93.2 (H) 43.0 - 80.0 %    Immature Granulocytes % 0.9 0.0 - 5.0 %    Lymphocytes % 3.1 (L) 20.0 - 42.0 %    Monocytes % 2.7 2.0 - 12.0 %    Eosinophils % 0.0 0.0 - 6.0 %    Basophils % 0.1 0.0 - 2.0 %    Neutrophils Absolute 13.81 (H) 1.80 - 7.30 E9/L    Immature Granulocytes # 0.13 E9/L    Lymphocytes Absolute 0.46 (L) 1.50 - 4.00 E9/L    Monocytes Absolute 0.40 0.10 - 0.95 E9/L    Eosinophils Absolute 0.00 (L) 0.05 - 0.50 E9/L    Basophils Absolute 0.02 0.00 - 0.20 E9/L   Comprehensive Metabolic Panel w/ Reflex to MG   Result Value Ref Range    Sodium 134 132 - 146 mmol/L    Potassium reflex Magnesium 3.7 3.5 - 5.0 mmol/L    Chloride 98 98 - 107 mmol/L    CO2 22 22 - 29 mmol/L    Anion Gap 14 7 - 16 mmol/L    Glucose 252 (H) 74 - 99 mg/dL    BUN 14 6 - 23 mg/dL    CREATININE 0.7 0.5 - 1.0 mg/dL    GFR Non-African American >60 >=60 mL/min/1.73    GFR African American >60     Calcium 8.6 8.6 - 10.2 mg/dL    Total Protein 6.8 6.4 - 8.3 g/dL    Albumin 3.4 (L) 3.5 - 5.2 g/dL    Total Bilirubin 0.8 0.0 - 1.2 mg/dL    Alkaline Phosphatase 86 35 - 104 U/L    ALT 26 0 - 32 U/L    AST 31 0 - 31 U/L   Lipase   Result Value Ref Range    Lipase 10 (L) 13 - 60 U/L   Troponin   Result Value Ref Range    Troponin, High Sensitivity 58 (H) 0 - 9 ng/L   Urinalysis, reflex to microscopic   Result Value Ref Range    Color, UA Yellow Straw/Yellow    Clarity, UA Clear Clear    Glucose, Ur 500 (A) Negative mg/dL    Bilirubin Urine Negative Negative    Ketones, Urine >=80 (A) Negative mg/dL    Specific Gravity, UA >=1.030 1.005 - 1.030    Blood, Urine LARGE (A) Negative    pH, UA 5.5 5.0 - 9.0    Protein, UA 30 (A) Negative mg/dL    Urobilinogen, Urine 1.0 <2.0 E.U./dL    Nitrite, Urine Negative Negative    Leukocyte Esterase, Urine Negative Negative   Lactic Acid, Plasma   Result Value Ref Range    Lactic Acid 2.0 0.5 - 2.2 mmol/L   Microscopic Urinalysis   Result Value Ref Range    WBC, UA 0-1 0 - 5 /HPF    RBC, UA 0-1 0 - 2 /HPF    Epithelial Cells, UA RARE /HPF    Bacteria, UA FEW (A) None Seen /HPF   Troponin   Result Value Ref Range    Troponin, High Sensitivity 83 (H) 0 - 9 ng/L   Troponin   Result Value Ref Range Troponin, High Sensitivity 112 (H) 0 - 9 ng/L   EKG 12 Lead   Result Value Ref Range    Ventricular Rate 106 BPM    Atrial Rate 106 BPM    P-R Interval 152 ms    QRS Duration 82 ms    Q-T Interval 352 ms    QTc Calculation (Bazett) 467 ms    P Axis 61 degrees    R Axis -13 degrees    T Axis 29 degrees       RADIOLOGY:  CT Head WO Contrast   Final Result   No acute intracranial abnormality. Small right posterior parietal/occipital scalp contusion. CT Cervical Spine WO Contrast   Final Result   No traumatic injuries in cervical spine. Focal 2 cm left thyroid nodule. Follow-up evaluation with outpatient thyroid   ultrasound may be obtained if for further evaluation. Ground-glass opacity in right lung apex. Follow-up separate CT chest report   for further details. CT ABDOMEN PELVIS W IV CONTRAST Additional Contrast? None   Final Result   CHEST:      No acute traumatic abnormality is evident in the chest.      Left thyroid nodule. See recommendation below. Enlarged main pulmonary artery, which can be seen in the setting of pulmonary   arterial hypertension. Mild cardiomegaly. Findings suggestive of pulmonary vascular congestion. Findings suggestive of ankylosing spondylitis. ABDOMEN/PELVIS:      Fracture of L2 vertebral body involving the anterior column. No significant   height loss or retropulsion. No evidence of solid or hollow organ injury. Nonspecific skin thickening in the lower anterior abdominal wall which may   represent cellulitis or simple body wall edema. No evidence of abscess. Small hiatal hernia. Atherosclerotic disease. Status post cholecystectomy. RECOMMENDATIONS:   2.6 cm incidental left thyroid nodule. Recommend thyroid US. Reference: J Am Rony Radiol.  2015 Feb;12(2): 143-50         CT CHEST WO CONTRAST   Final Result   CHEST:      No acute traumatic abnormality is evident in the chest.      Left thyroid nodule. See recommendation below. Enlarged main pulmonary artery, which can be seen in the setting of pulmonary   arterial hypertension. Mild cardiomegaly. Findings suggestive of pulmonary vascular congestion. Findings suggestive of ankylosing spondylitis. ABDOMEN/PELVIS:      Fracture of L2 vertebral body involving the anterior column. No significant   height loss or retropulsion. No evidence of solid or hollow organ injury. Nonspecific skin thickening in the lower anterior abdominal wall which may   represent cellulitis or simple body wall edema. No evidence of abscess. Small hiatal hernia. Atherosclerotic disease. Status post cholecystectomy. RECOMMENDATIONS:   2.6 cm incidental left thyroid nodule. Recommend thyroid US. Reference: J Am Rony Radiol. 2015 Feb;12(2): 143-50         US ABDOMEN LIMITED Specify organ? LIVER, PANCREAS, SPLEEN, APPENDIX    (Results Pending)       EKG: This EKG is signed and interpreted by me. Rate: 106  Rhythm: Sinus  Interpretation: sinus tachycardia  Comparison: no previous EKG and no previous EKG available      ------------------------- NURSING NOTES AND VITALS REVIEWED ---------------------------  Date / Time Roomed:  11/19/2021  9:04 AM  ED Bed Assignment:  06/06    The nursing notes within the ED encounter and vital signs as below have been reviewed.      Patient Vitals for the past 24 hrs:   BP Temp Temp src Pulse Resp SpO2 Height Weight   11/19/21 1740 (!) 140/71 98.9 °F (37.2 °C) Temporal 84 16 93 % -- --   11/19/21 1417 (!) 149/77 99.4 °F (37.4 °C) Oral 102 18 93 % -- --   11/19/21 0916 (!) 227/99 99 °F (37.2 °C) Oral 107 18 95 % 5' 7.5\" (1.715 m) 300 lb (136.1 kg)       Oxygen Saturation Interpretation: Normal    ------------------------------------------ PROGRESS NOTES ------------------------------------------  Re-evaluation(s):  Patients symptoms show no change  Repeat physical examination is not changed    Counseling:  I have spoken with the patient and discussed todays results, in addition to providing specific details for the plan of care and counseling regarding the diagnosis and prognosis. Their questions are answered at this time and they are agreeable with the plan of admission.    --------------------------------- ADDITIONAL PROVIDER NOTES ---------------------------------  Consultations:  Spoke with Dr. Kaitlynn Abbott. Discussed case. They will admit the patient. This patient's ED course included: a personal history and physicial examination, re-evaluation prior to disposition, multiple bedside re-evaluations, IV medications, cardiac monitoring and continuous pulse oximetry    This patient has remained hemodynamically stable during their ED course. Diagnosis:  1. Elevated troponin    2. Compression fracture of L2 vertebra[facility]        Disposition:  Patient's disposition: Admit  Patient's condition is stable.          Violette Hinkle DO  Resident  11/19/21 2931

## 2021-11-19 NOTE — ACP (ADVANCE CARE PLANNING)
Advance Care Planning     Advance Care Planning Activator (Inpatient)  Conversation Note      Date of ACP Conversation: 11/19/2021     Conversation Conducted with: Patient with Decision Making Capacity    ACP Activator: Damian Remy, [address] [facility] Decision Maker:     Current Designated Health Care Decision Maker:     Primary Decision Maker: Ivonne Tom - Spouse - [phone]    Secondary Decision Maker: Axel Anabaptist - Child - [phone]  Click here to complete Healthcare Decision Makers including section of the Healthcare Decision Maker Relationship (ie \"Primary\")  Today we documented Decision Maker(s) consistent with Legal Next of Kin hierarchy. Care Preferences    Ventilation: \"If you were in your present state of health and suddenly became very ill and were unable to breathe on your own, what would your preference be about the use of a ventilator (breathing machine) if it were available to you? \"      Would the patient desire the use of ventilator (breathing machine)?: no    \"If your health worsens and it becomes clear that your chance of recovery is unlikely, what would your preference be about the use of a ventilator (breathing machine) if it were available to you? \"     Would the patient desire the use of ventilator (breathing machine)?: No      Resuscitation  \"CPR works best to restart the heart when there is a sudden event, like a heart attack, in someone who is otherwise healthy. Unfortunately, CPR does not typically restart the heart for people who have serious health conditions or who are very sick. \"    \"In the event your heart stopped as a result of an underlying serious health condition, would you want attempts to be made to restart your heart (answer \"yes\" for attempt to resuscitate) or would you prefer a natural death (answer \"no\" for do not attempt to resuscitate)? \" yes       [] Yes   [x] No   Educated Patient / Decision Maker regarding differences between Advance Directives and portable DNR orders.     Length of ACP Conversation in minutes:  15    Conversation Outcomes:  [x] ACP discussion completed  [] Existing advance directive reviewed with patient; no changes to patient's previously recorded wishes  [] New Advance Directive completed  [] Portable Do Not Rescitate prepared for Provider review and signature  [] POLST/POST/MOLST/MOST prepared for Provider review and signature      Follow-up plan:    [] Schedule follow-up conversation to continue planning  [] Referred individual to Provider for additional questions/concerns   [] Advised patient/agent/surrogate to review completed ACP document and update if needed with changes in condition, patient preferences or care setting    [] This note routed to one or more involved healthcare providers

## 2021-11-20 NOTE — CONSULTS
Podiatry Consult H&P  11/20/2021   Raya Urbano       Consulting Diagnosis: Bilateral toe wounds from picking  Consulting Physician: Dr. Min Youngblood    Chief Complaint: Elevated troponins possible NSTEMI    SUBJECTIVE: Rayna Hammans is a 79 y.o. well-controlled type II diabetic female who presented to the [facility] ED on 11/19/2021 for a complaint of frequent falls. Podiatrist consulted 11/20/2021, evaluation and treatment of bilateral toe wounds with extensive tissue loss. Patient states that she first of all these wounds secondary to picking on her toenails and dry skin for 1 year ago. She states she has done nothing to treat these wounds and has not seen medical attention because she was nervous about what she would be told. She denies any active drainage or malodor from the wound recently. She has chronic lower extremity lymphedema and states that she was followed by her internalizing physician several months ago however she is not following up recently. She admits to a history of diabetes mellitus with blood sugars at home that run between 141 and 190 mg/dL. She complains of extensive back pain and abdominal pain and states that she has some pain to her feet however there is no elicitable pain on palpation to her toes. She denies any nausea, vomiting, diarrhea, fevers, chills, shortness of breath, calf pain, chest pain. Past Medical History:   Diagnosis Date    Back pain     Eczema         History reviewed. No pertinent surgical history. History reviewed. No pertinent family history. Social History     Tobacco Use    Smoking status: Never Smoker    Smokeless tobacco: Never Used   Substance Use Topics    Alcohol use: Never        Prior to Admission medications    Medication Sig Start Date End Date Taking?  Authorizing Provider   ibuprofen (ADVIL;MOTRIN) 200 MG tablet Take 400 mg by mouth every 6 hours as needed for Pain    Yes Historical Provider, MD   Cinnamon 500 MG CAPS Take 2 capsules by mouth daily   Yes Historical Provider, MD        Patient has no known allergies. OBJECTIVE:        Vitals:    11/20/21 0820   BP: (!) 184/78   Pulse: 109   Resp: 18   Temp: 99 °F (37.2 °C)   SpO2: 94%                  EXAM:        Pt is AAOx3, complains of back pain and abdominal pain. Vascular Exam:  DP and PT pulses nonpalpable bilaterally. CFT sluggish between 3 and 5 s to digits 2 through 5 left, one through 5 right. Unable to assess CFT to left hallux as there is no open wound and hyperkeratotic changes to the skin. Skin temperature from proximal lower leg to distal digits bilaterally is warm to warm. +2 pitting edema bilateral pretibial, ankle, foot. Neuro Exam: Diminished protective sensation bilaterally the digits and forefoot. Light touch reestablished to the proximal lower leg. Dermatologic Exam: Full-thickness ulcer down to the level of exposed deep fascia at the left hallux plantarly and medially with large soft tissue deficit to the medial plantar left hallux, partially avulsed nail with lateral aspect of the left hallux nail intact. There is hyperpigmented and hyper keratotic changes to the left hallux consistent with chronic edema and advanced lichenification. The wound does not probe to bone, there is no exposed muscle or tendon, the wound bed is very dry with no active drainage, no undermining, and, no crepitus or fluctuance. There is no proximal streaking. There are also unstageable wounds to the dorsal aspect of the right first and second digits, stable at this time with no active drainage or crepitus or fluctuance. There is no proximal streaking to these wounds, there is no malodor, there is no clinical signs of infection. There are chronic venous lower extremity edema skin changes to the anterior lower leg with thickening and papillomatous changes. MSK: Muscle strength 5/5 Bilateral . No tenderness on palpation.  There is pain with range of motion of the leg secondary to back pain and hip pain.     Current Facility-Administered Medications   Medication Dose Route Frequency Provider Last Rate Last Admin    sodium chloride flush 0.9 % injection 5-40 mL  5-40 mL IntraVENous 2 times per day Gwendolyn Herrera MD   10 mL at 11/19/21 2223    sodium chloride flush 0.9 % injection 5-40 mL  5-40 mL IntraVENous PRN Gwendolyn Herrera MD   10 mL at 11/19/21 2222    0.9 % sodium chloride infusion  25 mL IntraVENous PRN Gwendolyn Herrera MD        enoxaparin (LOVENOX) injection 40 mg  40 mg SubCUTAneous Daily Gwendolyn Herrera MD   40 mg at 11/20/21 0806    ondansetron (ZOFRAN-ODT) disintegrating tablet 4 mg  4 mg Oral Q8H PRN Gwendolyn Herrera MD        Or    [facility]ron [facility] injection 4 mg  4 mg IntraVENous Q6H PRN Gwendolyn Herrera MD        polyethylene glycol [facility]) packet 17 g  17 g Oral Daily PRN Gwendolyn Herrera MD        acetaminophen (TYLENOL) tablet 650 mg  650 mg Oral Q6H PRN Gwendolyn Herrera MD   650 mg at 11/19/21 2213    Or    acetaminophen (TYLENOL) suppository 650 mg  650 mg Rectal Q6H PRN Gwendolyn Herrera MD        insulin lispro (HUMALOG) injection vial 0-12 Units  0-12 Units SubCUTAneous TID WC Gwendolyn Herrera MD   2 Units at 11/19/21 2313    insulin lispro (HUMALOG) injection vial 0-6 Units  0-6 Units SubCUTAneous Nightly Gwendolyn Herrera MD   1 Units at 11/19/21 2309    glucose (GLUTOSE) 40 % oral gel 15 g  15 g Oral PRN Gwendolyn Herrera MD        dextrose 50 % IV solution  12.5 g IntraVENous PRN Gwendolyn Herrera MD        glucagon (rDNA) injection 1 mg  1 mg IntraMUSCular PRN Gwendolyn Herrera MD        dextrose 5 % solution  100 mL/hr IntraVENous PRN Gwendolyn Herrera MD        0.9 % sodium chloride infusion   IntraVENous Continuous Gwendolyn Herrera  mL/hr at 11/20/21 0556 New Bag at 11/20/21 0556    HYDROcodone-acetaminophen (NORCO) 5-325 MG per tablet 1 tablet  1 tablet Oral Q6H PRN Abhay Spain MD   1 tablet at

## 2021-11-20 NOTE — CONSULTS
Fior Campos M.D. [facility]  Dr. Nellie Wright M.D.,  Dr. Violeta Bender M.D.,  Dr. Darrian Hernandez D.O.,  Dr. Echo Whitten D.O. ,  Dr. Andrea Li M.D.,  Dr. Jenny Mcdowell D.O. Dasia Mckeon  79 y.o.  female      Re: Severe abdominal pain: CT A/P and abdominal ultrasound negative  Requesting physician:Dr Ahsan Dudley  Date:12:29 PM 11/20/2021          HPI: 51-year-old female patient presenting to the hospital yesterday after apparently falls at home. Patient is homebound and does not follow with physician. She reports that she attempted to establish with visiting physician however her insurance was not accepted. Patient has history of diabetes mellitus hypertension as well as morbid obesity  -she is not taking any medications as she has not seen a physician for years. According to the patient she has been experiencing abdominal pain which is diffuse and moderately severe for at least the past 1 year. Patient denies nausea vomiting previously but reports nausea vomiting since her fall at home. Patient denies hematemesis emesis of coffee-ground material.  She denies blood in the stool or melena. Patient reports significant back pain. She reports her abdominal pain is to be diffuse and moderately severe to severe currently associated with above described nausea vomiting. She denies fever or chills. Upon presentation to the hospital CT scan of the chest without contrast was obtained revealing fracture of the L2 vertebral body. Abdominal part of the noncontrasted CT reveals no acute traumatic abnormality of the liver, pancreas, spleen or kidneys. Reported to status post cholecystectomy according to the patient open cholecystectomy was performed secondary to inability to perform laparoscopic surgery. Patient reports cholecystectomy done years ago by Dr. Dena Tillman.  Patient denies previous colonoscopy. She reports upper endoscopy many years ago in the [de-identified].      Information sources:   -Patient  -family   -medical record  -health care team    PMHx:  Past Medical History:   Diagnosis Date    Back pain     Eczema        PSHx:History reviewed. No pertinent surgical history.     Meds:  Current Facility-Administered Medications   Medication Dose Route Frequency Provider Last Rate Last Admin    sodium chloride flush 0.9 % injection 5-40 mL  5-40 mL IntraVENous 2 times per day Ghanshyam Tracey MD   10 mL at 11/19/21 2223    sodium chloride flush 0.9 % injection 5-40 mL  5-40 mL IntraVENous PRN Ghanshyam Tracey MD   10 mL at 11/19/21 2222    0.9 % sodium chloride infusion  25 mL IntraVENous PRN Ghanshyam Tracey MD        enoxaparin (LOVENOX) injection 40 mg  40 mg SubCUTAneous Daily Ghanshyam Tracey MD   40 mg at 11/20/21 0806    ondansetron (ZOFRAN-ODT) disintegrating tablet 4 mg  4 mg Oral Q8H PRN Ghanshyam Tracey MD        Or    ondansetron [facility]) injection 4 mg  4 mg IntraVENous Q6H PRN Ghanshyam Tracey MD        polyethylene glycol [facility]) packet 17 g  17 g Oral Daily PRN Ghanshyam Tracey MD        acetaminophen (TYLENOL) tablet 650 mg  650 mg Oral Q6H PRN Ghanshyam Tracey MD   650 mg at 11/19/21 2213    Or    acetaminophen (TYLENOL) suppository 650 mg  650 mg Rectal Q6H PRN Ghanshyam Tracey MD        insulin lispro (HUMALOG) injection vial 0-12 Units  0-12 Units SubCUTAneous TID WC Ghanshyam Tracey MD   2 Units at 11/19/21 2313    insulin lispro (HUMALOG) injection vial 0-6 Units  0-6 Units SubCUTAneous Nightly Ghanshyam Tracey MD   1 Units at 11/19/21 2309    glucose (GLUTOSE) 40 % oral gel 15 g  15 g Oral PRN Ghanshyam Tracey MD        dextrose 50 % IV solution  12.5 g IntraVENous PRN Ghanshyam Tracey MD        glucagon (rDNA) injection 1 mg  1 mg IntraMUSCular PRN Ghanshyam Tracey MD        dextrose 5 % solution  100 mL/hr IntraVENous PRN Ghanshyam Tracey MD        0.9 % sodium chloride infusion   IntraVENous Continuous Peggy Sluder on file    Unstable Housing in the Last Year: Not on file       FamHx:History reviewed. No pertinent family history. Allergy:No Known Allergies      ROS: As described in the HPI and in addition is negative upon detailed review of systems or unobtainable unless otherwise stated in this dictation. PE:  BP (!) 184/78   Pulse 109   Temp 99 °F (37.2 °C) (Oral)   Resp 18   Ht 5' 7.5\" (1.715 m)   Wt (!) 306 lb (138.8 kg)   SpO2 94%   BMI 47.22 kg/m²     Gen. : Morbidly obese  female. NAD. Head: Atraumatic/normocephalic  Eyes: Anicteric sclera/moist oral mucosa  ENT: EOMI/anicteric sclera/no conjunctival erythema  Neck: Supple/trachea midline  Chest: CTA B  Cor:  Regular/S1-S2  Abdomen: Soft and morbidly obese. Diffusely tender without guarding.  BS +. No significant erythema of the abdominal wall. Extr.:  BLE 3+ edema  Muscles: Decreased muscle tone and bulk throughout  Skin: Warm and dry. Anicteric      DATA:     Lab Results   Component Value Date    WBC 14.8 11/19/2021    RBC 4.30 11/19/2021    HGB 12.2 11/19/2021    HCT 37.5 11/19/2021    MCV 87.2 11/19/2021    MCH 28.4 11/19/2021    MCHC 32.5 11/19/2021    RDW 13.4 11/19/2021     11/19/2021    MPV 9.9 11/19/2021     Lab Results   Component Value Date     11/19/2021    K 3.7 11/19/2021    CL 98 11/19/2021    CO2 22 11/19/2021    BUN 14 11/19/2021    CREATININE 0.7 11/19/2021    CALCIUM 8.6 11/19/2021    PROT 6.8 11/19/2021    LABALBU 3.4 11/19/2021    LABALBU 4.3 11/05/2011    BILITOT 0.8 11/19/2021    ALKPHOS 86 11/19/2021    AST 31 11/19/2021    ALT 26 11/19/2021     Lab Results   Component Value Date    LIPASE 10 11/19/2021     No results found for: AMYLASE      ASSESSMENT/PLAN:    1.   Abdominal pain  -Chronic abdominal pain for over a year  -Currently associated with nausea and vomiting  -No evidence of overt bleed  -Start PPI IV twice a day  -Start clear liquid diabetic diet and advance as tolerated  -Cannot exclude gastroparesis   -Consider nuclear medicine gastric emptying study upon resolution of nausea vomiting   -Consider further evaluation with upper endoscopy    2. Leukocytosis  -Questionable infectious etiology  -Defer further work-up/treatment to admitting    3. Fall/vertebral fracture  -Per admitting/orthopedic surgery    4. Elevated troponin/non-STEMI  -Per admitting/cardiology    5. Diabetes mellitus  -Per admitting    6. Morbid obesity  -Recommend evaluation for JORGE  -Patient will benefit from weight reduction strategies  -Defer discussion and management to admitting    7. CRC screening  -No previous colonoscopy  -Given patient morbid obesity/being housebound, consider inpatient procedure    Above has been discussed with the patient and her /son at bedside and all questions answered to their satisfaction. They are agreeable with the plan is delineated. Thank you for the opportunity to see this patient in consultation. Chhaya Deng MD  11/20/2021  12:29 PM    NOTE:  This report was transcribed using voice recognition software. Every effort was made to ensure accuracy; however, inadvertent computerized transcription errors may be present.

## 2021-11-20 NOTE — PROGRESS NOTES
[address] [place], [place] [address]   Progress Note    Admitting Date and Time: 11/19/2021  9:04 AM  Admit Dx: Elevated troponin [R77.8]  Compression fracture of L2 vertebra, initial encounter ([facility] [address] [S32.020A]    Subjective:    Pt feels about the same as yesterday, still has significant abdominal pain and back pain. Denies chest pain, SOB, fever/chills or other complaints. Per RN: Nothing to report    ROS: denies fever, chills, cp, sob, n/v, HA unless stated above.      carvedilol  6.25 mg Oral BID WC    atorvastatin  40 mg Oral Nightly    pantoprazole  40 mg IntraVENous BID    And    sodium chloride (PF)  10 mL IntraVENous BID    sodium chloride flush  5-40 mL IntraVENous 2 times per day    enoxaparin  40 mg SubCUTAneous Daily    insulin lispro  0-12 Units SubCUTAneous TID WC    insulin lispro  0-6 Units SubCUTAneous Nightly     sodium chloride flush, 5-40 mL, PRN  sodium chloride, 25 mL, PRN  ondansetron, 4 mg, Q8H PRN   Or  ondansetron, 4 mg, Q6H PRN  polyethylene glycol, 17 g, Daily PRN  acetaminophen, 650 mg, Q6H PRN   Or  acetaminophen, 650 mg, Q6H PRN  glucose, 15 g, PRN  dextrose, 12.5 g, PRN  glucagon (rDNA), 1 mg, PRN  dextrose, 100 mL/hr, PRN  HYDROcodone 5 mg - acetaminophen, 1 tablet, Q6H PRN         Objective:    BP (!) 184/78   Pulse 109   Temp 99 °F (37.2 °C) (Oral)   Resp 18   Ht 5' 7.5\" (1.715 m)   Wt (!) 306 lb (138.8 kg)   SpO2 94%   BMI 47.22 kg/m²   General Appearance: alert and oriented to person, place and time and in no acute distress  Skin: warm and dry  Head: normocephalic and atraumatic  Eyes: pupils equal, round, and reactive to light, extraocular eye movements intact, conjunctivae normal  Neck: neck supple and non tender without mass   Pulmonary/Chest: clear to auscultation bilaterally- no wheezes, rales or rhonchi, normal air movement, no respiratory distress  Cardiovascular: normal rate, normal S1 and S2 and no carotid bruits  Abdomen: soft, non-tender, non-distended, normal bowel sounds, no masses or organomegaly  Extremities: no cyanosis, no clubbing and no edema  Neurologic: no cranial nerve deficit and speech normal      Recent Labs     11/19/21  1056      K 3.7   CL 98   CO2 22   BUN 14   CREATININE 0.7   GLUCOSE 252*   CALCIUM 8.6       Recent Labs     11/19/21  1056   ALKPHOS 86   PROT 6.8   LABALBU 3.4*   BILITOT 0.8   AST 31   ALT 26       Recent Labs     11/19/21  1056   WBC 14.8*   RBC 4.30   HGB 12.2   HCT 37.5   MCV 87.2   MCH 28.4   MCHC 32.5   RDW 13.4      MPV 9.9           Radiology:   MRI LUMBAR SPINE WO CONTRAST   Final Result   1. Oblique fracture of the anterior portion of the L2 vertebral body with   mild compression as described above. This could be acute or subacute. The   patient should return for completion of the examination when clinically   feasible. 2. Degenerative change. Changes of ankylosing spondylitis in the lower   thoracic spine. Possible mild central canal stenosis at L1-2.         US ABDOMEN LIMITED Specify organ? LIVER, PANCREAS, SPLEEN, APPENDIX   Final Result   Unremarkable right upper quadrant ultrasound. CT Head WO Contrast   Final Result   No acute intracranial abnormality. Small right posterior parietal/occipital scalp contusion. CT Cervical Spine WO Contrast   Final Result   No traumatic injuries in cervical spine. Focal 2 cm left thyroid nodule. Follow-up evaluation with outpatient thyroid   ultrasound may be obtained if for further evaluation. Ground-glass opacity in right lung apex. Follow-up separate CT chest report   for further details. CT ABDOMEN PELVIS W IV CONTRAST Additional Contrast? None   Final Result   CHEST:      No acute traumatic abnormality is evident in the chest.      Left thyroid nodule. See recommendation below. Enlarged main pulmonary artery, which can be seen in the setting of pulmonary   arterial hypertension.       Mild cardiomegaly. Findings suggestive of pulmonary vascular congestion. Findings suggestive of ankylosing spondylitis. ABDOMEN/PELVIS:      Fracture of L2 vertebral body involving the anterior column. No significant   height loss or retropulsion. No evidence of solid or hollow organ injury. Nonspecific skin thickening in the lower anterior abdominal wall which may   represent cellulitis or simple body wall edema. No evidence of abscess. Small hiatal hernia. Atherosclerotic disease. Status post cholecystectomy. RECOMMENDATIONS:   2.6 cm incidental left thyroid nodule. Recommend thyroid US. Reference: J Am Rony Radiol. 2015 Feb;12(2): 143-50         CT CHEST WO CONTRAST   Final Result   CHEST:      No acute traumatic abnormality is evident in the chest.      Left thyroid nodule. See recommendation below. Enlarged main pulmonary artery, which can be seen in the setting of pulmonary   arterial hypertension. Mild cardiomegaly. Findings suggestive of pulmonary vascular congestion. Findings suggestive of ankylosing spondylitis. ABDOMEN/PELVIS:      Fracture of L2 vertebral body involving the anterior column. No significant   height loss or retropulsion. No evidence of solid or hollow organ injury. Nonspecific skin thickening in the lower anterior abdominal wall which may   represent cellulitis or simple body wall edema. No evidence of abscess. Small hiatal hernia. Atherosclerotic disease. Status post cholecystectomy. RECOMMENDATIONS:   2.6 cm incidental left thyroid nodule. Recommend thyroid US. Reference: J Am Rony Radiol.  2015 Feb;12(2): 143-50         XR FOOT RIGHT (MIN 3 VIEWS)    (Results Pending)   XR FOOT LEFT (MIN 3 VIEWS)    (Results Pending)   VL LOWER EXTREMITY ARTERIAL SEGMENTAL PRESSURES W PPG    (Results Pending)       Assessment:  Active Problems:    Elevated troponin  Resolved Problems:    * No resolved hospital problems. *      Plan:  1. Elevated troponins, possible NSTEMI  -Troponin trend: 58--> 83 --> 112--> 99  -EKG with sinus tach and nonspecific ST changes  -Patient did not complain of any CP or SOB  -Cardiology consulted, ordered echo and planning for ischemic eval once abd pain worked up  Anthony Matthews on telemetry and await cardio recs.      2. N/V and abdominal pain  -Started after fall, states it is pain radiated from back  -Lipase negative, LFTs and lactic acid WNL, CT abdomen/pelvis not showing any acute findings (however patient did have cholecystectomy previously)  -Abd US also unremarkable, will consult GI for further recommendations     3. Frequent falls   -Patient has become weaker and has fallen multiple times (no LOC or head trauma) - family and patient interested in inpatient rehab  -Will consult PT/OT and SW already gave patient options for [facility]  -FU patient's progress     4. DM, uncontrolled, untreated, possible DKA  -Patient stated she had a history of DM, however has not been to a PCP in over 10-15 years so has not been on any medications  -Glucose on admission 252, anion gap 14 and patient had ketones and glucose in urine  -BHB slightly elevated but venous pH WNL- will monitor closely  -Continue medium ISS and monitor glucose      5. Bilateral foot wounds  -Patient with significant b/l foot wounds with part of toes missing which patient states is from picking at the skin  -Podiatry consulted, provided local wound care today. Assessing circulation with arterial studies as well as xrays - will FU results and subsequent recommendations    7. L2 vertebral body fracture  -Mild compression of vertebral body present and is either acute vs subacute  -Orthopedic surgery consulted, stated patient is high risk for kyphoplasty due to BMI and risk of cement extravasation of fracture lines and recommending conservative care with PT/OT     NOTE: This report was transcribed using voice recognition software. Every effort was made to ensure accuracy; however, inadvertent computerized transcription errors may be present.      Electronically signed by Teena Carty MD on 11/20/2021 at 1:52 PM

## 2021-11-20 NOTE — PROGRESS NOTES
Comprehensive Nutrition Assessment    Type and Reason for Visit:  Initial, Positive Nutrition Screen, Wound    Nutrition Recommendations/Plan: Continue NPO, ADAT and start Gelatein 20 BID w/ nutrition progression    Nutrition Assessment:  Pt w/ multiple mechanical falls at home/L2 compression fx. Noted elevated troponin/possible DKA. Hx DM. Pt w/ b/l foot wounds- currently NPO, ADAT and add Gelatein 20 BID w/ nutrition progression. Malnutrition Assessment:  Malnutrition Status:  No malnutrition    Context:  Acute Illness     Findings of the 6 clinical characteristics of malnutrition:  Energy Intake:  Mild decrease in energy intake (Comment) (NPO X1 day)  Weight Loss:  Unable to assess (d/t lack of wt hx per EMR)     Body Fat Loss:  No significant body fat loss     Muscle Mass Loss:  No significant muscle mass loss    Fluid Accumulation:  No significant fluid accumulation     Strength:  Not Performed    Estimated Daily Nutrient Needs:  Energy (kcal):   (MSJ 1949 X 1.2 SF); Weight Used for Energy Requirements:  Current     Protein (g):  125-140 (2-2.2);  Weight Used for Protein Requirements:  Ideal        Fluid (ml/day):  ; Method Used for Fluid Requirements:  1 ml/kcal      Nutrition Related Findings:  A&O, BLE non-pitting/generalized non-pitting edema, abd soft/rounded, BS present, nausea      Wounds:  Multiple, Wound Consult Pending (B/L foot wounds- pt picks at skin)       Current Nutrition Therapies:    Diet NPO    Anthropometric Measures:  · Height: 5' 7.5\" (171.5 cm)  · Current Body Weight: 306 lb (138.8 kg) (11/20 bed)   · Usual Body Weight:  (no wt hx per EMR)     · Ideal Body Weight: 138 lbs; % Ideal Body Weight 221.7 %   · BMI: 47.2  · BMI Categories: Obese Class 3 (BMI 40.0 or greater)       Nutrition Diagnosis:   · Inadequate oral intake related to inadequate protein-energy intake as evidenced by NPO or clear liquid status due to medical condition, nausea    Nutrition Interventions:   Nutrition Education/Counseling:  Education initiated (discussed appetite/ONS w/ pt)   Coordination of Nutrition Care:  Continue to monitor while inpatient    Goals:  Nutrition progression       Nutrition Monitoring and Evaluation:   Food/Nutrient Intake Outcomes:  Diet Advancement/Tolerance  Physical Signs/Symptoms Outcomes:  Biochemical Data, GI Status, Nausea or Vomiting, Fluid Status or Edema, Nutrition Focused Physical Findings, Skin, Weight     Discharge Planning:     Too soon to determine     Electronically signed by Lucia Marcano RD, LD on 11/20/21 at 10:53 AM EST  Contact: [phone]

## 2021-11-20 NOTE — PROGRESS NOTES
Date:2021  Patient Name: Bhavik Rollins  MRN: [id number]  : 1950  ROOM #: [id number]    Physical Therapy order received, chart reviewed and evaluation attempted this date. Patient is unavailable for PT evaluation due to being off floor for MRI. Will attempt PT evaluation at a later time. Thank you.    Jennifer Higgins, PT, DPT

## 2021-11-20 NOTE — CONSULTS
INPATIENT CARDIOLOGY CONSULT    Name: Bhavik Rollins    Age: 79 y.o. Date of Admission: 11/19/2021  9:04 AM    Date of Service: 11/20/2021    Reason for Consultation: Mild troponin leak    Chief Complaint   Patient presents with    Fall     this is second fall this am, some back pain, slight incontinence per EMS, patient has scratched her legs/ankles raw per EMS, patient unable to get up from fall and states she does not remember what happened. Referring Physician: Astrid Corcoran MD    History of Present Illness: 72-year-old female with history of morbid obesity with BMI of 47, diabetes, hypertension, noncompliant, chronic back pain and chronic lower extremity ulcers who is hospitalized for fall and found to have oblique fracture of the anterior portion of L2 vertebral body with mild compression that appears to be either acute or subacute. Cardiology is consulted for mild troponin leak. I talked to patient and  at length and patient reports he has been having nausea and vomiting and significant abdominal pain. She reports she has not been vaccinated with COVID-19. She mentioned she is also been having some chills but denies fever. Review of Systems:   Cardiac: As per HPI  General: Denies fever or chills  Pulmonary: As per HPI  HEENT: Denies runny nose  GI: No complaints  : No complaints  Endocrine: Denies night sweats  Musculoskeletal: No complaints  Skin: Dry skin  Neuro: No complaints  Psych: Denies depression    Past Medical History:  Past Medical History:   Diagnosis Date    Back pain     Eczema        Past Surgical History:  History reviewed. No pertinent surgical history. Family History:  History reviewed. No pertinent family history.     Social History:  Social History     Socioeconomic History    Marital status:      Spouse name: Not on file    Number of children: Not on file    Years of education: Not on file    Highest education level: Not on file Current Medications:  Current Facility-Administered Medications   Medication Dose Route Frequency Provider Last Rate Last Admin    sodium chloride flush 0.9 % injection 5-40 mL  5-40 mL IntraVENous 2 times per day Scott Villarreal MD   10 mL at 11/19/21 2223    sodium chloride flush 0.9 % injection 5-40 mL  5-40 mL IntraVENous PRN Scott Villarreal MD   10 mL at 11/19/21 2222    0.9 % sodium chloride infusion  25 mL IntraVENous PRN Scott Villarreal MD        enoxaparin (LOVENOX) injection 40 mg  40 mg SubCUTAneous Daily Scott Villarreal MD   40 mg at 11/20/21 0806    ondansetron (ZOFRAN-ODT) disintegrating tablet 4 mg  4 mg Oral Q8H PRN Scott Villarreal MD        Or    [facility] injection 4 mg  4 mg IntraVENous Q6H PRN Scott Villarreal MD        polyethylene glycol [facility]) packet 17 g  17 g Oral Daily PRN Scott Villarreal MD        acetaminophen (TYLENOL) tablet 650 mg  650 mg Oral Q6H PRN Scott Villarreal MD   650 mg at 11/19/21 2213    Or    acetaminophen (TYLENOL) suppository 650 mg  650 mg Rectal Q6H PRN Scott Villarreal MD        insulin lispro (HUMALOG) injection vial 0-12 Units  0-12 Units SubCUTAneous TID WC Scott Villarreal MD   2 Units at 11/19/21 2313    insulin lispro (HUMALOG) injection vial 0-6 Units  0-6 Units SubCUTAneous Nightly Scott Villarreal MD   1 Units at 11/19/21 2309    glucose (GLUTOSE) 40 % oral gel 15 g  15 g Oral PRN Scott Villarreal MD        dextrose 50 % IV solution  12.5 g IntraVENous PRN Scott Villarreal MD        glucagon (rDNA) injection 1 mg  1 mg IntraMUSCular PRN Scott Villarreal MD        dextrose 5 % solution  100 mL/hr IntraVENous PRN Scott Villarreal MD        0.9 % sodium chloride infusion   IntraVENous Continuous Scott Villarreal  mL/hr at 11/20/21 0556 New Bag at 11/20/21 0556    HYDROcodone-acetaminophen (NORCO) 5-325 MG per tablet 1 tablet  1 tablet Oral Q6H PRN Emily Beth MD   1 tablet at 11/20/21 0806      sodium chloride      dextrose      sodium chloride 125 mL/hr at 11/20/21 0556       Physical Exam:  BP (!) 184/78   Pulse 109   Temp 99 °F (37.2 °C) (Oral)   Resp 18   Ht 5' 7.5\" (1.715 m)   Wt (!) 306 lb (138.8 kg)   SpO2 94%   BMI 47.22 kg/m²   Wt Readings from Last 3 Encounters:   11/20/21 (!) 306 lb (138.8 kg)   03/04/20 280 lb (127 kg)       Appearance: Alert and oriented x3 not in acute distress. Skin: Dry skin  Head: Atraumatic  Eyes: Intact extraocular muscles   ENMT: Mucous membranes are moist  Neck: Supple  Lungs: Clear to auscultation  Cardiac: Normal S1 and S2  Abdomen: Protuberant  Extremities: Intact range of motion  Neurologic: No focal neurological deficits  Peripheral Pulses: 2+ peripheral pulses    Intake/Output:    Intake/Output Summary (Last 24 hours) at 11/20/2021 1226  Last data filed at 11/20/2021 0859  Gross per 24 hour   Intake 0 ml   Output --   Net 0 ml     No intake/output data recorded.     Laboratory Tests:  Recent Labs     11/19/21  1056      K 3.7   CL 98   CO2 22   BUN 14   CREATININE 0.7   GLUCOSE 252*   CALCIUM 8.6     No results found for: MG  Recent Labs     11/19/21  1056   ALKPHOS 86   ALT 26   AST 31   PROT 6.8   BILITOT 0.8   LABALBU 3.4*     Recent Labs     11/19/21  1056   WBC 14.8*   RBC 4.30   HGB 12.2   HCT 37.5   MCV 87.2   MCH 28.4   MCHC 32.5   RDW 13.4      MPV 9.9     No results found for: CKTOTAL, CKMB, CKMBINDEX, TROPONINI  Recent Labs     11/19/21  1056 11/19/21  1405 11/19/21  1619 11/19/21 2022   TROPHS 58* 83* 112* 99*     No results found for: INR, PROTIME  Lab Results   Component Value Date    TSH 2.312 11/05/2011    TSH 1.598 11/06/2010     Lab Results   Component Value Date    LABA1C 6.6 (H) 11/05/2011    LABA1C 5.8 06/06/2011    LABA1C 9.0 (H) 11/06/2010     No results found for: EAG  Lab Results   Component Value Date    CHOL 218 (H) 06/06/2011    CHOL 221 (H) 11/06/2010     Lab Results   Component Value Date TRIG 114 06/06/2011    TRIG 183 (H) 11/06/2010     Lab Results   Component Value Date    HDL 47.0 06/06/2011    HDL 44.0 11/06/2010     Lab Results   Component Value Date    LDLCALC 148 (H) 06/06/2011    LDLCALC 140 (H) 11/06/2010     No results found for: LABVLDL, VLDL  No results found for: CHOLHDLRATIO  No results for input(s): PROBNP in the last 72 hours. Cardiac Tests:  EKG reviewed (EKG date: On November 19, 2021 shows sinus tachycardia, 106 bpm, nonspecific ST-T wave changes):    Echocardiogram reviewed:     Stress test reviewed:      Cardiac catheterization reviewed:     CXR reviewed: The ASCVD Risk score (Leela Hardin, et al., 2013) failed to calculate for the following reasons:    Cannot find a previous HDL lab    Cannot find a previous total cholesterol lab    ASSESSMENT / PLAN:    1. Elevated troponin  Currently denies any chest pain. Her main complaint is abdominal pain, nausea and vomiting  Will recommend echocardiogram to guide therapy  Ischemic evaluation with Lexiscan pharmacologic myocardial perfusion stress test once abdominal symptoms have resolved  Continue on aspirin. I have initiated low-dose beta-blocker and statin therapy  Obtain fasting lipid profile and TSH in a.m. Continue to trend cardiac enzymes and EKG  2. Compression fracture of L2 vertebra, initial encounter ([facility] [address]  Management per surgery. 3.  Nausea and vomiting as well as chills with leukocytosis  Management per primary service  Rule out infectious etiology  Patient has not been vaccinated with COVID-19  Consider ruling out COVID-19  4. Lower extremity foot ulcers  Podiatry following  5. Diabetes  Management per primary service  6. Hypertension  Uncontrolled   6.25 carvedilol p.o. twice a day is initiated  7. Fall  Strict fall precaution            Thank you for allowing me to participate in your patient's care. Please feel free to contact me if you have any questions or concerns.     Giselle Grubbs MD  [id number] [facility]

## 2021-11-21 NOTE — PROGRESS NOTES
Physical Therapy    Physical Therapy Initial Evaluation/Plan of Care    Room #:  5812/5701-43  Patient Name: Agnes Armendariz  YOB: 1950  MRN: [id number]    Date of Service: 11/21/2021     Tentative placement recommendation: Subacute rehab  Equipment recommendation: To be determined      Evaluating Physical Therapist: Junior Cortez, PT  #[id number]      Specific Provider Orders/Date/Referring Provider :  11/19/21 1900   PT eval and treat Start: 11/19/21 1900, End: 11/19/21 1900, ONE TIME, Standing Count: 1 Occurrences, R    Order went unreviewed at transfer on Fri Nov 19, 2021 10:06 PM   Lizeth Farley MD      Admitting Diagnosis:   Elevated troponin [R77.8]  [facility] [S32.020A]    Admitted with    fall and sustained above; pt fell at least 3 times  Surgery: nonenot candidate for kyphoplasty per dr Emir Mckenna note  Visit Diagnoses       Codes    Compression fracture of L2 vertebra, initial encounter [facility])     S32.020A          Patient Active Problem List   Diagnosis    Elevated troponin        ASSESSMENT of Current Deficits Patient exhibits decreased strength, balance, endurance, coordination and pain bilateral knees impairing functional mobility, transfers, gait , gait distance and tolerance to activity are barriers to d/c and require skilled intervention during hospital stay to attain pre hospital level of function. Decreased strength, balance and endurance  increases patient's risk for fall.  Fear of falling and anxiety are limiting to participation and ability      PHYSICAL THERAPY  PLAN OF CARE       Physical therapy plan of care is established based on physician order,  patient diagnosis and clinical assessment    Current Treatment Recommendations:    -Bed Mobility: Lower extremity exercises , Upper extremity exercises  and Trunk control activities   -Sitting Balance: Incorporate reaching activities to activate trunk muscles , Hands on support to maintain midline , Facilitate active trunk muscle engagement  and Facilitate postural control in all planes   -Standing Balance: Perform strengthening exercises in standing to promote motor control with or without upper extremity support , Instruct patient on adequate base of support to maintain balance and Challenge balance utilizing reaching  activities beyond center of gravity    -Transfers: Provide instruction on proper hand and foot position for adequate transfer of weight onto lower extremities and use of gait device if needed, Cues for hand placement, technique and safety. Provide stabilization to prevent fall , Support transfer of weight on to lower extremities and Assist with extension of knees trunk and hip to accept weight transfer   -Gait: Gait training, Standing activities to improve: base of support, weight shift, weight bearing , Exercises to improve trunk control and Exercises to improve hip and knee control   -Endurance: Utilize Supervised activities to increase level of endurance to allow for safe functional mobility including transfers and gait     PT long term treatment goals are located in below grid    Patient and or family understand(s) diagnosis, prognosis, and plan of care. Frequency of treatments: Patient will be seen  daily. Prior Level of Function: Patient ambulated with wheeled walker    Rehab Potential: good    for baseline    Past medical history:   Past Medical History:   Diagnosis Date    Back pain     Eczema      History reviewed. No pertinent surgical history.     SUBJECTIVE:    Precautions: Bedrest with bathroom Privileges , falls, alarm and O2 , 3 Liters of o2 via nasal cannula   Social history: Patient lives with spouse in a apartment full flight of steps that she has been unable to [employer] for 3 years    Equipment owned: Cane, Standard Walker and [employer],    AM-PAC Basic Mobility        AM-PAC Mobility Inpatient   How much difficulty turning over in bed?: DARLEEN poor  Fatigues easily    Vitals: 4 liters nasal cannula   Blood Pressure at rest  Blood Pressure during session    Heart Rate at rest   Heart Rate during session     SPO2 at rest 89%  At 3 Liters of o2 via nasal cannula  SPO2 during session 91% 4 liters     Patient education  Patient educated on role of Physical Therapy, risks of immobility, safety and plan of care, importance of positional changes for oxygen exchange,  importance of mobility while in hospital , purse lip breathing, safety  and positioning for skin integrity and comfort     Patient response to education:   Pt verbalized understanding Pt demonstrated skill Pt requires further education in this area   Yes Partial Yes      Treatment:  Patient practiced and was instructed/facilitated in the following treatment: Patient   Sat edge of bed 10 minutes with Minimal assist of 1 to increase dynamic sitting balance and activity tolerance. seated challenges     Therapeutic Exercises:  ankle pumps  x 10 reps. At end of session, patient in bed with alarm call light and phone within reach,  all lines and tubes intact, nursing notified. Patient would benefit from continued skilled Physical Therapy to improve functional independence and quality of life. Patient's/ family goals   get stronger    Time in  1104  Time out  1134    Total Treatment Time  10 minutes    Evaluation time includes thorough review of current medical information, gathering information on past medical history/social history and prior level of function, completion of standardized testing/informal observation of tasks, assessment of data, and development of Plan of care and goals.      CPT codes:  Low Complexity PT evaluation (64965)  Therapeutic activities (32295)   10 minutes  1 unit(s)    Stef Mckinnon, PT

## 2021-11-21 NOTE — PROGRESS NOTES
[address] [place], [place] [address]ist   Progress Note    Admitting Date and Time: 11/19/2021  9:04 AM  Admit Dx: Elevated troponin [R77.8]  Compression fracture of L2 vertebra, initial encounter ([facility] [address] [S32.020A]    Subjective:    Pt feels about the same as yesterday, still has significant abdominal pain and back pain. Denies chest pain, SOB, fever/chills or other complaints. Per RN: Patient with multiple episodes of incontinence as can not get on bedpan easily or fast enough    ROS: denies fever, chills, cp, sob, n/v, HA unless stated above.      carvedilol  12.5 mg Oral BID WC    atorvastatin  40 mg Oral Nightly    pantoprazole  40 mg IntraVENous BID    And    sodium chloride (PF)  10 mL IntraVENous BID    sodium chloride flush  5-40 mL IntraVENous 2 times per day    enoxaparin  40 mg SubCUTAneous Daily    insulin lispro  0-12 Units SubCUTAneous TID WC    insulin lispro  0-6 Units SubCUTAneous Nightly     sodium chloride flush, 5-40 mL, PRN  sodium chloride, 25 mL, PRN  ondansetron, 4 mg, Q8H PRN   Or  ondansetron, 4 mg, Q6H PRN  polyethylene glycol, 17 g, Daily PRN  acetaminophen, 650 mg, Q6H PRN   Or  acetaminophen, 650 mg, Q6H PRN  glucose, 15 g, PRN  dextrose, 12.5 g, PRN  glucagon (rDNA), 1 mg, PRN  dextrose, 100 mL/hr, PRN  HYDROcodone 5 mg - acetaminophen, 1 tablet, Q6H PRN         Objective:    BP (!) 154/74   Pulse 109   Temp 97.6 °F (36.4 °C) (Oral)   Resp 20   Ht 5' 7.5\" (1.715 m)   Wt (!) 316 lb 14.4 oz (143.7 kg)   SpO2 94%   BMI 48.90 kg/m²   General Appearance: alert and oriented to person, place and time and in no acute distress  Skin: warm and dry  Head: normocephalic and atraumatic  Eyes: pupils equal, round, and reactive to light, extraocular eye movements intact, conjunctivae normal  Neck: neck supple and non tender without mass   Pulmonary/Chest: clear to auscultation bilaterally- no wheezes, rales or rhonchi, normal air movement, no respiratory distress  Cardiovascular: normal rate, normal S1 and S2 and no carotid bruits  Abdomen: soft, non-tender, non-distended, normal bowel sounds, no masses or organomegaly  Extremities: no cyanosis, no clubbing and no edema  Neurologic: no cranial nerve deficit and speech normal      Recent Labs     11/19/21  1056 11/20/21  1357    133   K 3.7 3.9   CL 98 97*   CO2 22 22   BUN 14 16   CREATININE 0.7 0.8   GLUCOSE 252* 222*   CALCIUM 8.6 7.8*       Recent Labs     11/19/21  1056   ALKPHOS 86   PROT 6.8   LABALBU 3.4*   BILITOT 0.8   AST 31   ALT 26       Recent Labs     11/19/21  1056 11/20/21  1357   WBC 14.8* 15.7*   RBC 4.30 3.75   HGB 12.2 10.6*   HCT 37.5 33.5*   MCV 87.2 89.3   MCH 28.4 28.3   MCHC 32.5 31.6*   RDW 13.4 13.5    167   MPV 9.9 10.6           Radiology:   XR FOOT LEFT (MIN 3 VIEWS)   Final Result   1. New amputation of distal aspect of distal phalanx of 1st digit suggesting   acute or chronic osteomyelitis. 2. New flattening of plantar arch and inferior subluxation of talus in   relation to navicular. 3. No subcutaneous gas. XR FOOT RIGHT (2 VIEWS)   Final Result   No evidence of osteomyelitis or subcutaneous gas associated with the right   foot. MRI LUMBAR SPINE WO CONTRAST   Final Result   1. Oblique fracture of the anterior portion of the L2 vertebral body with   mild compression as described above. This could be acute or subacute. The   patient should return for completion of the examination when clinically   feasible. 2. Degenerative change. Changes of ankylosing spondylitis in the lower   thoracic spine. Possible mild central canal stenosis at L1-2.         US ABDOMEN LIMITED Specify organ? LIVER, PANCREAS, SPLEEN, APPENDIX   Final Result   Unremarkable right upper quadrant ultrasound. CT Head WO Contrast   Final Result   No acute intracranial abnormality. Small right posterior parietal/occipital scalp contusion.          CT Cervical Spine WO Contrast   Final Result   No traumatic injuries in cervical spine. Focal 2 cm left thyroid nodule. Follow-up evaluation with outpatient thyroid   ultrasound may be obtained if for further evaluation. Ground-glass opacity in right lung apex. Follow-up separate CT chest report   for further details. CT ABDOMEN PELVIS W IV CONTRAST Additional Contrast? None   Final Result   CHEST:      No acute traumatic abnormality is evident in the chest.      Left thyroid nodule. See recommendation below. Enlarged main pulmonary artery, which can be seen in the setting of pulmonary   arterial hypertension. Mild cardiomegaly. Findings suggestive of pulmonary vascular congestion. Findings suggestive of ankylosing spondylitis. ABDOMEN/PELVIS:      Fracture of L2 vertebral body involving the anterior column. No significant   height loss or retropulsion. No evidence of solid or hollow organ injury. Nonspecific skin thickening in the lower anterior abdominal wall which may   represent cellulitis or simple body wall edema. No evidence of abscess. Small hiatal hernia. Atherosclerotic disease. Status post cholecystectomy. RECOMMENDATIONS:   2.6 cm incidental left thyroid nodule. Recommend thyroid US. Reference: J Am Rony Radiol. 2015 Feb;12(2): 143-50         CT CHEST WO CONTRAST   Final Result   CHEST:      No acute traumatic abnormality is evident in the chest.      Left thyroid nodule. See recommendation below. Enlarged main pulmonary artery, which can be seen in the setting of pulmonary   arterial hypertension. Mild cardiomegaly. Findings suggestive of pulmonary vascular congestion. Findings suggestive of ankylosing spondylitis. ABDOMEN/PELVIS:      Fracture of L2 vertebral body involving the anterior column. No significant   height loss or retropulsion. No evidence of solid or hollow organ injury.       Nonspecific skin thickening in the lower anterior abdominal wall which may   represent cellulitis or simple body wall edema. No evidence of abscess. Small hiatal hernia. Atherosclerotic disease. Status post cholecystectomy. RECOMMENDATIONS:   2.6 cm incidental left thyroid nodule. Recommend thyroid US. Reference: J Am Rony Radiol. 2015 Feb;12(2): 143-50         VL LOWER EXTREMITY ARTERIAL SEGMENTAL PRESSURES W PPG    (Results Pending)   MRI FOOT LEFT WO CONTRAST    (Results Pending)       Assessment:  Active Problems:    Elevated troponin  Resolved Problems:    * No resolved hospital problems. *      Plan:  1. Elevated troponins, possible NSTEMI  -Troponin trend: 58--> 83 --> 112--> 99  -EKG with sinus tach and nonspecific ST changes  -Patient did not complain of any CP or SOB  -Cardiology consulted, ordered echo and planning for ischemic eval once abd pain worked up  Anthony Matthews on telemetry      2. N/V and abdominal pain  -Started after fall, states it is pain radiated from back  -Lipase negative, LFTs and lactic acid WNL, CT abdomen/pelvis not showing any acute findings (however patient did have cholecystectomy previously)  -Abd US also unremarkable  -GI consulted yesterday, stated they are considering gastric emptying study and EGD      3. Frequent falls   -Patient has become weaker and has fallen multiple times (no LOC or head trauma) - family and patient interested in inpatient rehab  -Will consult PT/OT and SW already gave patient options for [facility]  -FU patient's progress     4. DM, uncontrolled, untreated, possible DKA  -Patient stated she had a history of DM, however has not been to a PCP in over 10-15 years so has not been on any medications  -Glucose on admission 252, anion gap 14 and patient had ketones and glucose in urine  -BHB slightly elevated but venous pH WNL- will monitor closely. Glucose 182 this AM  -Continue medium ISS and monitor glucose      5.  Bilateral foot wounds  -Patient with significant b/l foot wounds with part of toes missing which patient states is from picking at the skin  -Podiatry consulted, provided local wound care today. Assessing circulation with arterial studies, which have not been done yet  -R foot xray normal however L foot xray shows signs of osteomyelitis on 1st digit - podiatry ordered MRI to evaluate further - will FU results and subsequent recommendations    7. L2 vertebral body fracture  -Mild compression of vertebral body present and is either acute vs subacute  -Orthopedic surgery consulted, stated patient is high risk for kyphoplasty due to BMI and risk of cement extravasation of fracture lines and recommending conservative care with PT/OT     NOTE: This report was transcribed using voice recognition software. Every effort was made to ensure accuracy; however, inadvertent computerized transcription errors may be present.      Electronically signed by Cassandra Howell MD on 11/21/2021 at 12:03 PM

## 2021-11-21 NOTE — PROGRESS NOTES
Podiatry Progress Note  11/21/2021   Raya Urbano       SUBJECTIVE: Teresa Lopez is a 79 y.o. female seen bedside for follow-up of bilateral full-thickness ulcers. X-ray reveals possible osteomyelitis left hallux distal phalanx. MRI ordered and pending to confirm. Arterial studies are pending. Resting comfortably bed this a.m. with no new complaints.  at bedside. Possible surgical intervention early next week discussed with patient and her . They are agreeable. Denies any chest pain or shortness of breath, endorses some abdominal pain with nausea and vomiting that is improved today. Past Medical History:   Diagnosis Date    Back pain     Eczema         History reviewed. No pertinent surgical history. History reviewed. No pertinent family history. Social History     Tobacco Use    Smoking status: Never Smoker    Smokeless tobacco: Never Used   Substance Use Topics    Alcohol use: Never        Prior to Admission medications    Medication Sig Start Date End Date Taking? Authorizing Provider   ibuprofen (ADVIL;MOTRIN) 200 MG tablet Take 400 mg by mouth every 6 hours as needed for Pain    Yes Historical Provider, MD   Cinnamon 500 MG CAPS Take 2 capsules by mouth daily   Yes Historical Provider, MD        Patient has no known allergies. OBJECTIVE:        Vitals:    11/21/21 0830   BP: (!) 154/74   Pulse: 109   Resp: 20   Temp: 97.6 °F (36.4 °C)   SpO2: 94%                  EXAM:        Pt is AAOx3    Vascular Exam:  DP and PT pulses nonpalpable bilaterally. CFT sluggish between 3 and 5 s to digits 2 through 5 left, one through 5 right. Unable to assess CFT to left hallux as there is no open wound and hyperkeratotic changes to the skin. Skin temperature from proximal lower leg to distal digits bilaterally is warm to warm. +2 pitting edema bilateral pretibial, ankle, foot.     Neuro Exam: Diminished protective sensation bilaterally the digits and forefoot.  Light touch reestablished to the proximal lower leg.     Dermatologic Exam: Full-thickness ulcer down to the level of exposed deep fascia at the left hallux plantarly and medially with large soft tissue deficit to the medial plantar left hallux, partially avulsed nail with lateral aspect of the left hallux nail intact. There is hyperpigmented and hyper keratotic changes to the left hallux consistent with chronic edema and advanced lichenification. The wound does not probe to bone, there is no exposed muscle or tendon, the wound bed is very dry with no active drainage, no undermining, and, no crepitus or fluctuance. There is no proximal streaking.      There are also unstageable wounds to the dorsal aspect of the right first and second digits, stable at this time with no active drainage or crepitus or fluctuance. There is no proximal streaking to these wounds, there is no malodor, there is no clinical signs of infection. There are chronic venous lower extremity edema skin changes to the anterior lower leg with thickening and papillomatous changes.     MSK: Muscle strength 5/5 Bilateral . No tenderness on palpation. There is pain with range of motion of the leg secondary to back pain and hip pain.     Current Facility-Administered Medications   Medication Dose Route Frequency Provider Last Rate Last Admin    carvedilol (COREG) tablet 12.5 mg  12.5 mg Oral BID WC Hilario Laura MD        atorvastatin (LIPITOR) tablet 40 mg  40 mg Oral Nightly Hilario Laura MD   40 mg at 11/20/21 2038    pantoprazole (PROTONIX) injection 40 mg  40 mg IntraVENous BID Sarah Callaway MD   40 mg at 11/21/21 0801    And    sodium chloride (PF) 0.9 % injection 10 mL  10 mL IntraVENous BID Sarah Callaway MD   10 mL at 11/21/21 0801    sodium chloride flush 0.9 % injection 5-40 mL  5-40 mL IntraVENous 2 times per day Sandro Ulloa MD   10 mL at 11/20/21 2044    sodium chloride flush 0.9 % injection 5-40 mL  5-40 mL IntraVENous PRN Masha Holt MD   10 mL at 11/19/21 2222    0.9 % sodium chloride infusion  25 mL IntraVENous PRN Masha Holt MD        enoxaparin (LOVENOX) injection 40 mg  40 mg SubCUTAneous Daily Masha Holt MD   40 mg at 11/21/21 0801    ondansetron (ZOFRAN-ODT) disintegrating tablet 4 mg  4 mg Oral Q8H PRN Masha Holt MD        Or    ondansetron [facility]) injection 4 mg  4 mg IntraVENous Q6H PRN Masha Holt MD   4 mg at 11/20/21 1751    polyethylene glycol (GLYCOLAX) packet 17 g  17 g Oral Daily PRN Masha Holt MD        acetaminophen (TYLENOL) tablet 650 mg  650 mg Oral Q6H PRN Masha Holt MD   650 mg at 11/20/21 2038    Or    acetaminophen (TYLENOL) suppository 650 mg  650 mg Rectal Q6H PRN Masha Holt MD        insulin lispro (HUMALOG) injection vial 0-12 Units  0-12 Units SubCUTAneous TID WC Masha Holt MD   4 Units at 11/21/21 1133    insulin lispro (HUMALOG) injection vial 0-6 Units  0-6 Units SubCUTAneous Nightly Masha Holt MD   2 Units at 11/20/21 2046    glucose (GLUTOSE) 40 % oral gel 15 g  15 g Oral PRN Masha Holt MD        dextrose 50 % IV solution  12.5 g IntraVENous PRN Masha Holt MD        glucagon (rDNA) injection 1 mg  1 mg IntraMUSCular PRN Masha Holt MD        dextrose 5 % solution  100 mL/hr IntraVENous PRN Masha Holt MD        0.9 % sodium chloride infusion   IntraVENous Continuous Masha Holt  mL/hr at 11/21/21 0802 New Bag at 11/21/21 0802    HYDROcodone-acetaminophen (NORCO) 5-325 MG per tablet 1 tablet  1 tablet Oral Q6H PRN Hannah Syed MD   1 tablet at 11/21/21 0802        Lab Results   Component Value Date    WBC 15.7 (H) 11/20/2021    HCT 33.5 (L) 11/20/2021    HGB 10.6 (L) 11/20/2021     11/20/2021     11/20/2021    K 3.9 11/20/2021    CL 97 (L) 11/20/2021    CO2 22 11/20/2021    BUN 16 11/20/2021    CREATININE 0.8 11/20/2021    GLUCOSE 222 (H) 11/20/2021         ASSESSMENT:  -Full-thickness ulcer down to level of exposed deep fascia, POA, not infected  -Type 2 diabetes mellitus with peripheral neuropathy  -Uncontrolled untreated diabetes with possible DKA     PLAN:  - Examined and evaluated  - All labs, imaging, and charts reviewed   - WBC 15.7  -X-rays bilateral feet 11/20/2021 reveals no acute findings right lower extremity, acute versus chronic osteomyelitis left hallux distal phalanx with no subQ gas, flattened plantar arch with subluxation of talus in relation to the navicular. -MRI left foot ordered and pending 11/21/2021  -Arterial studies ordered and pending 11/20/2021  -Dressings consisted of Xeroform DSD bilaterally.  -Pending my left foot and arterial study findings will consider surgical invention. Local wound care for now   -We will continue to follow    D/W:   Janet Stock DPM FACFAS  Fellowship-Trained Foot and Ankle Surgeon  Diplomate, American Board of Foot and Ankle Surgeons  [phone]       Thank you for involving podiatry in this patients care. Please do not hesitate to call with any questions or concerns.      Nathan Ross Podiatry PGY2  11/21/2021   1:29 PM

## 2021-11-21 NOTE — PROGRESS NOTES
INPATIENT CARDIOLOGY CONSULT    Name: Dalila Dorantes    Age: 79 y.o. Date of Admission: 11/19/2021  9:04 AM    Date of Service: 11/21/2021    Reason for Consultation: Mild troponin leak    Chief Complaint   Patient presents with   Alma Griffins Fall     this is second fall this am, some back pain, slight incontinence per EMS, patient has scratched her legs/ankles raw per EMS, patient unable to get up from fall and states she does not remember what happened. Referring Physician: Marco A Solorio MD    Subjective: Denies chest pain or shortness of breath. Reports abdominal pain and nausea vomiting improving but not completely resolved          Past Medical History:  Past Medical History:   Diagnosis Date    Back pain     Eczema        Past Surgical History:  History reviewed. No pertinent surgical history. Family History:  History reviewed. No pertinent family history. Social History:  Social History     Socioeconomic History    Marital status:      Spouse name: Not on file    Number of children: Not on file    Years of education: Not on file    Highest education level: Not on file   Occupational History    Not on file   Tobacco Use    Smoking status: Never Smoker    Smokeless tobacco: Never Used   Substance and Sexual Activity    Alcohol use: Never    Drug use: Never    Sexual activity: Not Currently   Other Topics Concern    Not on file   Social History Narrative    Not on file     Social Determinants of Health     Financial Resource Strain:     Difficulty of Paying Living Expenses: Not on file   Food Insecurity:     Worried About Running Out of Food in the Last Year: Not on file    Akash of Food in the Last Year: Not on file   Transportation Needs:     Lack of Transportation (Medical): Not on file    Lack of Transportation (Non-Medical):  Not on file   Physical Activity:     Days of Exercise per Week: Not on file    Minutes of Exercise per Session: Not on file   Stress:     Feeling of Stress : Not on file   Social Connections:     Frequency of Communication with Friends and Family: Not on file    Frequency of Social Gatherings with Friends and Family: Not on file    Attends Yazidism Services: Not on file    Active Member of Clubs or Organizations: Not on file    Attends Club or Organization Meetings: Not on file    Marital Status: Not on file   Intimate Partner Violence:     Fear of Current or Ex-Partner: Not on file    Emotionally Abused: Not on file    Physically Abused: Not on file    Sexually Abused: Not on file   Housing Stability:     Unable to Pay for Housing in the Last Year: Not on file    Number of [place] in the Last Year: Not on file    Unstable Housing in the Last Year: Not on file       Allergies:  No Known Allergies    Home Medications:  Prior to Admission medications    Medication Sig Start Date End Date Taking?  Authorizing Provider   ibuprofen (ADVIL;MOTRIN) 200 MG tablet Take 400 mg by mouth every 6 hours as needed for Pain    Yes Historical Provider, MD   Cinnamon 500 MG CAPS Take 2 capsules by mouth daily   Yes Historical Provider, MD       Current Medications:  Current Facility-Administered Medications   Medication Dose Route Frequency Provider Last Rate Last Admin    carvedilol (COREG) tablet 6.25 mg  6.25 mg Oral BID  Hilario Shaquille Laura MD   6.25 mg at 11/21/21 0802    atorvastatin (LIPITOR) tablet 40 mg  40 mg Oral Nightly Marco Gladis Laura MD   40 mg at 11/20/21 2038    pantoprazole (PROTONIX) injection 40 mg  40 mg IntraVENous BID Apple Sanchez MD   40 mg at 11/21/21 0801    And    sodium chloride (PF) 0.9 % injection 10 mL  10 mL IntraVENous BID Apple Sanchez MD   10 mL at 11/21/21 0801    sodium chloride flush 0.9 % injection 5-40 mL  5-40 mL IntraVENous 2 times per day Gillian Díaz MD   10 mL at 11/20/21 2044    sodium chloride flush 0.9 % injection 5-40 mL  5-40 mL IntraVENous PRN Gillian Díaz MD   10 mL at 11/19/21 2222    0.9 % sodium chloride infusion  25 mL IntraVENous PRN Lizeth Farley MD        enoxaparin (LOVENOX) injection 40 mg  40 mg SubCUTAneous Daily Lizeth Farley MD   40 mg at 11/21/21 0801    ondansetron (ZOFRAN-ODT) disintegrating tablet 4 mg  4 mg Oral Q8H PRN Lizeth Farley MD        Or    ondansetron [facility]) injection 4 mg  4 mg IntraVENous Q6H PRN Lizeth Farley MD   4 mg at 11/20/21 1751    polyethylene glycol (GLYCOLAX) packet 17 g  17 g Oral Daily PRN Lizeth Farley MD        acetaminophen (TYLENOL) tablet 650 mg  650 mg Oral Q6H PRN Lizeth Farley MD   650 mg at 11/20/21 2038    Or    acetaminophen (TYLENOL) suppository 650 mg  650 mg Rectal Q6H PRN Lizeth Farley MD        insulin lispro (HUMALOG) injection vial 0-12 Units  0-12 Units SubCUTAneous TID WC Lizeth Farley MD   2 Units at 11/21/21 0805    insulin lispro (HUMALOG) injection vial 0-6 Units  0-6 Units SubCUTAneous Nightly Lizeth Farley MD   2 Units at 11/20/21 2046    glucose (GLUTOSE) 40 % oral gel 15 g  15 g Oral PRN Lizeth Farley MD        dextrose 50 % IV solution  12.5 g IntraVENous PRN Lizeth Farley MD        glucagon (rDNA) injection 1 mg  1 mg IntraMUSCular PRN Lizeth Farley MD        dextrose 5 % solution  100 mL/hr IntraVENous PRN Lizeth Farley MD        0.9 % sodium chloride infusion   IntraVENous Continuous Lizeth Farley  mL/hr at 11/21/21 0802 New Bag at 11/21/21 0802    HYDROcodone-acetaminophen (NORCO) 5-325 MG per tablet 1 tablet  1 tablet Oral Q6H PRN Nubia Zapata MD   1 tablet at 11/21/21 0802      sodium chloride      dextrose      sodium chloride 125 mL/hr at 11/21/21 0802       Physical Exam:  BP (!) 154/74   Pulse 109   Temp 97.6 °F (36.4 °C) (Oral)   Resp 20   Ht 5' 7.5\" (1.715 m)   Wt (!) 316 lb 14.4 oz (143.7 kg)   SpO2 94%   BMI 48.90 kg/m²   Wt Readings from Last 3 Encounters:   11/21/21 (!) 316 lb 14.4 oz (143.7 kg)   03/04/20 280 lb (127 kg)       Appearance: Alert and oriented x3 not in acute distress.   Skin: Dry skin  Head: Atraumatic  Eyes: Intact extraocular muscles   ENMT: Mucous membranes are moist  Neck: Supple  Lungs: Clear to auscultation  Cardiac: Normal S1 and S2  Abdomen: Protuberant  Extremities: Intact range of motion  Neurologic: No focal neurological deficits  Peripheral Pulses: 2+ peripheral pulses    Intake/Output:    Intake/Output Summary (Last 24 hours) at 11/21/2021 1101  Last data filed at 11/21/2021 1028  Gross per 24 hour   Intake 480 ml   Output --   Net 480 ml     I/O this shift:  In: 240 [P.O.:240]  Out: -     Laboratory Tests:  Recent Labs     11/19/21  1056 11/20/21  1357    133   K 3.7 3.9   CL 98 97*   CO2 22 22   BUN 14 16   CREATININE 0.7 0.8   GLUCOSE 252* 222*   CALCIUM 8.6 7.8*     No results found for: MG  Recent Labs     11/19/21  1056   ALKPHOS 86   ALT 26   AST 31   PROT 6.8   BILITOT 0.8   LABALBU 3.4*     Recent Labs     11/19/21  1056 11/20/21  1357   WBC 14.8* 15.7*   RBC 4.30 3.75   HGB 12.2 10.6*   HCT 37.5 33.5*   MCV 87.2 89.3   MCH 28.4 28.3   MCHC 32.5 31.6*   RDW 13.4 13.5    167   MPV 9.9 10.6     No results found for: CKTOTAL, CKMB, CKMBINDEX, TROPONINI  Recent Labs     11/19/21  1056 11/19/21  1405 11/19/21  1619 11/19/21 2022   TROPHS 58* 83* 112* 99*     No results found for: INR, PROTIME  Lab Results   Component Value Date    TSH 2.312 11/05/2011    TSH 1.598 11/06/2010     Lab Results   Component Value Date    LABA1C 6.6 (H) 11/05/2011    LABA1C 5.8 06/06/2011    LABA1C 9.0 (H) 11/06/2010     No results found for: EAG  Lab Results   Component Value Date    CHOL 218 (H) 06/06/2011    CHOL 221 (H) 11/06/2010     Lab Results   Component Value Date    TRIG 114 06/06/2011    TRIG 183 (H) 11/06/2010     Lab Results   Component Value Date    HDL 47.0 06/06/2011    HDL 44.0 11/06/2010     Lab Results   Component Value Date    LDLCALC 148 (H) 06/06/2011    LDLCALC 140 (H) 11/06/2010     No results found for: LABVLDL, VLDL  No results found for: CHOLHDLRATIO  No results for input(s): PROBNP in the last 72 hours. Cardiac Tests:  EKG reviewed (EKG date: On November 19, 2021 shows sinus tachycardia, 106 bpm, nonspecific ST-T wave changes):    Echocardiogram reviewed:     Stress test reviewed:      Cardiac catheterization reviewed:     CXR reviewed: The ASCVD Risk score (Aparna Adams, et al., 2013) failed to calculate for the following reasons:    Cannot find a previous HDL lab    Cannot find a previous total cholesterol lab    ASSESSMENT / PLAN:    1. Elevated troponin  She again  denies any chest pain. Her main complaint is abdominal pain, nausea and vomiting which she reports is improving today but not completely gone  Awaiting echocardiogram  Ischemic evaluation with Lexiscan pharmacologic myocardial perfusion stress test once abdominal symptoms have resolved  Continue on aspirin. Beta-blocker was initiated yesterday and the dose is increased to optimize management  Awaiting lipid profile and TSH. Continue to trend cardiac enzymes and EKG  2. Compression fracture of L2 vertebra, initial encounter ([facility] [address]  Management per surgery. 3.  Nausea and vomiting as well as chills with leukocytosis  Management per primary service and GI      4. Lower extremity foot ulcers  Podiatry following  5. Diabetes  Management per primary service  6. Hypertension  Uncontrolled   I increase carvedilol to 12.5 mg p.o. twice a day for optimal management   7. Fall  Strict fall precaution            Thank you for allowing me to participate in your patient's care. Please feel free to contact me if you have any questions or concerns.     Jaycob Manuel MD  [facility]) Cardiology

## 2021-11-21 NOTE — PROGRESS NOTES
PROGRESS NOTE  By Jame Garcia M.D. [facility]  Dr. Sun Troncoso M.D.,  Dr. Tyrell Barnes M.D.,   Dr. Violette Gabriel D.O.,  Dr. Sivakumar Van M.D.,  Dr. Tito Cm D.O.,  Gissell Woods D.O. Mesha Muscat  79 y.o.  female    SUBJECTIVE:  Improved abdominal pain. Results nausea vomiting    OBJECTIVE:    BP (!) 154/74   Pulse 109   Temp 97.6 °F (36.4 °C) (Oral)   Resp 20   Ht 5' 7.5\" (1.715 m)   Wt (!) 316 lb 14.4 oz (143.7 kg)   SpO2 94%   BMI 48.90 kg/m²     General: Morbidly obese  female. AAO x3  HEENT: Anicteric sclera/moist oral mucosa  Neck: Supple/trachea is midline  Chest: CTA. Symmetrical excursions  Cor: Regular tachycardia  Abd.: Soft and obese. Extr.:  BLE edema unchanged. Decreased muscle tone and bulk throughout  Skin: Warm and dry with anicteric      DATA:    Monitor data reviewed -sinus tachycardia noted. Lab Results   Component Value Date    WBC 15.7 11/20/2021    RBC 3.75 11/20/2021    HGB 10.6 11/20/2021    HCT 33.5 11/20/2021    MCV 89.3 11/20/2021    MCH 28.3 11/20/2021    MCHC 31.6 11/20/2021    RDW 13.5 11/20/2021     11/20/2021    MPV 10.6 11/20/2021     Lab Results   Component Value Date     11/20/2021    K 3.9 11/20/2021    CL 97 11/20/2021    CO2 22 11/20/2021    BUN 16 11/20/2021    CREATININE 0.8 11/20/2021    CALCIUM 7.8 11/20/2021    PROT 6.8 11/19/2021    LABALBU 3.4 11/19/2021    LABALBU 4.3 11/05/2011    BILITOT 0.8 11/19/2021    ALKPHOS 86 11/19/2021    AST 31 11/19/2021    ALT 26 11/19/2021     Lab Results   Component Value Date    LIPASE 10 11/19/2021     No results found for: AMYLASE      ASSESSMENT/PLAN:    1.   Abdominal pain  -Chronic abdominal pain for over a year  -Currently associated with nausea and vomiting  -No evidence of overt bleed  -Continue PPI IV twice a day  -Advance diet-Cannot exclude gastroparesis   -Consider nuclear medicine gastric emptying study upon resolution of nausea vomiting -Consider further evaluation with upper endoscopy     2.  Leukocytosis  -Questionable infectious etiology  -Defer further work-up/treatment to admitting     3. Fall/vertebral fracture  -Per admitting/orthopedic surgery     4.  Elevated troponin/non-STEMI  -Cardiology input appreciated  -Per admitting/cardiology     5. Diabetes mellitus  -Per admitting     6. Morbid obesity  -Recommend evaluation for JORGE  -Patient will benefit from weight reduction strategies  -Defer discussion and management to admitting     7. CRC screening  -No previous colonoscopy  -Given patient morbid obesity/being housebound, consider inpatient procedure   .    Jose Amaro MD  11/21/2021  10:33 AM    NOTE:  This report was transcribed using voice recognition software. Every effort was made to ensure accuracy; however, inadvertent computerized transcription errors may be present.

## 2021-11-21 NOTE — PROGRESS NOTES
[address] [facility]  [facility] [place]. [place]        Date:2021                                                  Patient Name: Grace Gaston    MRN: [id number]    : 1950    Room: [facility][note 3][id number]      Evaluating OT: Gallito Shannon OTR/L; [id number]     Referring Provider and Specific Provider Orders/Date:      21  OT eval and treat  Start:  21,   End:  21,   ONE TIME,   Standing Count:  1 Occurrences,   R        Order went unreviewed at transfer on  10:06 PM    Gris Nguyễn MD     Placement Recommendation: Subacute        Diagnosis:   1. Elevated troponin    2. Compression fracture of L2 vertebra, initial encounter [facility])         Surgery: none       Pertinent Medical History:       Past Medical History:   Diagnosis Date    Back pain     Eczema        History reviewed. No pertinent surgical history. Precautions:  Fall Risk, L2 compression fx, no surgical intervention planned, wounds to feet, pt states she has had 3 recent falls at home.       Assessment of current deficits    [x] Functional mobility  [x]ADLs  [x] Strength               []Cognition    [x] Functional transfers   [x] IADLs         [] Safety Awareness   [x]Endurance    [] Fine Coordination              [x] Balance      [] Vision/perception   []Sensation     []Gross Motor Coordination  [x] ROM  [] Delirium                   [] Motor Control     OT PLAN OF CARE   OT POC based on physician orders, patient diagnosis and results of clinical assessment    Frequency/Duration 1-3 days/wk for 2 weeks PRN     Specific OT Treatment Interventions to include:   * Instruction/training on adapted ADL techniques and AE recommendations to increase functional independence within precautions       * Training on energy conservation strategies, correct breathing pattern and techniques to improve independence/tolerance for self-care routine  * Functional transfer/mobility training/DME recommendations for increased independence, safety, and fall prevention  * Patient/Family education to increase follow through with safety techniques and functional independence  * Recommendation of environmental modifications for increased safety with functional transfers/mobility and ADLs  * Splinting/positioning for increased function, prevention of contractures, and improve skin integrity  * Therapeutic exercise to improve motor endurance, ROM, and functional strength for ADLs/functional transfers  * Positioning to improve skin integrity, interaction with environment and functional independence    Recommended Adaptive Equipment: TBD at rehab      Home Living: with spouse, 2nd floor apartment, 1 step to enter, 10+10 steps to 2nd floor with rail, tub shower. Equipment owned: standard walker, cane, grab bars    Prior Level of Function: Independent with ADLs , needs assistance with IADLs; ambulated with standard walker    Driving: no  Occupation: not working     Pain Level: 8/10 pain in back and stomach; Facilitated repositioning. Nursing notified.       Cognition: A&O: 4/4; Follows 1 step directions   Memory: good    Sequencing: good    Problem solving: good    Judgement/safety: good     [facility]   AM-PAC Daily Activity Inpatient   How much help for putting on and taking off regular lower body clothing?: Total  How much help for Bathing?: A Lot  How much help for Toileting?: Total  How much help for putting on and taking off regular upper body clothing?: A Lot  How much help for taking care of personal grooming?: A Little  How much help for eating meals?: A Little  AM-[facility] Inpatient Daily Activity Raw Score: 12  AM-PAC Inpatient ADL T-Scale Score : 30.6  ADL Inpatient CMS 0-100% Score: 66.57  ADL Inpatient CMS G-Code Modifier : CL     Functional Assessment:    Initial Eval Status  Date: 11/21/21 Treatment Status  Date: STGs = LTGs  Time frame: 10-14 days   Feeding Supervision with set up to bring cup to mouth   Independent    Grooming Minimal Assist   Independent    UB Dressing Maximal Assist   Supervision    LB Dressing Dependent   Moderate Assist    Bathing Maximal Assist  Minimal Assist   Toileting Dependent   Minimal Assist    Bed Mobility  Maximal assist for rolling while completing hygiene, bathing, and to change comfort glide/chux,  maximal verbal cues for hand placement to assist with rolling and positioning. Maximal assist x 2 to scoot up in bed while in trendelenburg. Maximal assist for positioning in bed for symmetry and to float heels. Supine to sit: Minimal Assist   Sit to supine: Minimal Assist    Functional Transfers N/T as pt refused after being tired from bathing. Minimal Assist    Functional Mobility N/T   Minimal Assist    Balance Sitting:     Static: fair supported in bed, assistance needed to reposition ARMIDA FRANCE's     Activity Tolerance poor  good    Visual/  Perceptual Glasses: yes                 Hand Dominance: right      AROM (PROM) Strength Additional Info:    RUE  WFL 4/5 good  and wfl FMC/dexterity noted during ADL tasks     LUE WFL 4/5 good  and wfl FMC/dexterity noted during ADL tasks       Hearing: [facility]   Sensation:  No c/o numbness or tingling  Tone: WFL   Edema: yes, B LE's     Comments: Upon arrival the patient was supine. At end of session, patient was supine with ARMIDA FRANCE elevated on a pilow with call light and phone within reach, all lines and tubes intact. Spouse present. Overall patient demonstrated decreased independence and safety during completion of ADL/functional transfer/mobility tasks. Pt would benefit from continued skilled OT to increase safety and independence with completion of ADL/IADL tasks for functional independence and quality of life.     Treatment: OT treatment provided this date includes:    Instruction/training on safety and adapted techniques for completion of ADLs    Instruction/training on

## 2021-11-22 PROBLEM — S91.302A OPEN WOUND OF LEFT FOOT: Status: ACTIVE | Noted: 2021-01-01

## 2021-11-22 PROBLEM — R78.81 BACTEREMIA: Status: ACTIVE | Noted: 2021-01-01

## 2021-11-22 PROBLEM — E11.9 DIABETES (HCC): Status: ACTIVE | Noted: 2021-01-01

## 2021-11-22 PROBLEM — N39.0 UTI (URINARY TRACT INFECTION): Status: ACTIVE | Noted: 2021-01-01

## 2021-11-22 PROBLEM — S91.301A OPEN WOUND OF RIGHT FOOT: Status: ACTIVE | Noted: 2021-01-01

## 2021-11-22 PROBLEM — N17.9 ACUTE KIDNEY INJURY (HCC): Status: ACTIVE | Noted: 2021-01-01

## 2021-11-22 NOTE — PROGRESS NOTES
Attempted tx with pt, however pt currently OOR at  MRI and vascular. Will attempt tx with pt at later time/date.  Paul Handler, [address]

## 2021-11-22 NOTE — PROGRESS NOTES
Physical Therapy        [id number]    Patient unavailable for physical therapy treatment due to patient out of room for vascular and MRI. Will attempt session at later time/date.     Liz Duarte, PTA  #[id number]

## 2021-11-22 NOTE — PROGRESS NOTES
non-tender, non-distended, normal bowel sounds, no masses or organomegaly  Extremities: no cyanosis, no clubbing and no edema  Neurologic: no cranial nerve deficit and speech normal      Recent Labs     11/20/21  1357 11/21/21  1340 11/22/21  0505    132 132   K 3.9 4.5 4.0   CL 97* 99 98   CO2 22 24 22   BUN 16 19 27*   CREATININE 0.8 0.9 1.5*   GLUCOSE 222* 203* 69*   CALCIUM 7.8* 7.7* 7.6*       No results for input(s): ALKPHOS, PROT, LABALBU, BILITOT, AST, ALT in the last 72 hours. Recent Labs     11/20/21  1357 11/21/21  1340 11/22/21  0505   WBC 15.7* 13.0* 14.4*   RBC 3.75 3.50 3.14*   HGB 10.6* 9.8* 9.0*   HCT 33.5* 31.2* 28.1*   MCV 89.3 89.1 89.5   MCH 28.3 28.0 28.7   MCHC 31.6* 31.4* 32.0   RDW 13.5 13.8 13.9    164 143   MPV 10.6 10.7 11.0           Radiology:   XR FOOT LEFT (MIN 3 VIEWS)   Final Result   1. New amputation of distal aspect of distal phalanx of 1st digit suggesting   acute or chronic osteomyelitis. 2. New flattening of plantar arch and inferior subluxation of talus in   relation to navicular. 3. No subcutaneous gas. XR FOOT RIGHT (2 VIEWS)   Final Result   No evidence of osteomyelitis or subcutaneous gas associated with the right   foot. MRI LUMBAR SPINE WO CONTRAST   Final Result   1. Oblique fracture of the anterior portion of the L2 vertebral body with   mild compression as described above. This could be acute or subacute. The   patient should return for completion of the examination when clinically   feasible. 2. Degenerative change. Changes of ankylosing spondylitis in the lower   thoracic spine. Possible mild central canal stenosis at L1-2.         US ABDOMEN LIMITED Specify organ? LIVER, PANCREAS, SPLEEN, APPENDIX   Final Result   Unremarkable right upper quadrant ultrasound. CT Head WO Contrast   Final Result   No acute intracranial abnormality. Small right posterior parietal/occipital scalp contusion.          CT Cervical Spine WO Contrast   Final Result   No traumatic injuries in cervical spine. Focal 2 cm left thyroid nodule. Follow-up evaluation with outpatient thyroid   ultrasound may be obtained if for further evaluation. Ground-glass opacity in right lung apex. Follow-up separate CT chest report   for further details. CT ABDOMEN PELVIS W IV CONTRAST Additional Contrast? None   Final Result   CHEST:      No acute traumatic abnormality is evident in the chest.      Left thyroid nodule. See recommendation below. Enlarged main pulmonary artery, which can be seen in the setting of pulmonary   arterial hypertension. Mild cardiomegaly. Findings suggestive of pulmonary vascular congestion. Findings suggestive of ankylosing spondylitis. ABDOMEN/PELVIS:      Fracture of L2 vertebral body involving the anterior column. No significant   height loss or retropulsion. No evidence of solid or hollow organ injury. Nonspecific skin thickening in the lower anterior abdominal wall which may   represent cellulitis or simple body wall edema. No evidence of abscess. Small hiatal hernia. Atherosclerotic disease. Status post cholecystectomy. RECOMMENDATIONS:   2.6 cm incidental left thyroid nodule. Recommend thyroid US. Reference: J Am Rony Radiol. 2015 Feb;12(2): 143-50         CT CHEST WO CONTRAST   Final Result   CHEST:      No acute traumatic abnormality is evident in the chest.      Left thyroid nodule. See recommendation below. Enlarged main pulmonary artery, which can be seen in the setting of pulmonary   arterial hypertension. Mild cardiomegaly. Findings suggestive of pulmonary vascular congestion. Findings suggestive of ankylosing spondylitis. ABDOMEN/PELVIS:      Fracture of L2 vertebral body involving the anterior column. No significant   height loss or retropulsion. No evidence of solid or hollow organ injury.       Nonspecific skin thickening in the lower anterior abdominal wall which may   represent cellulitis or simple body wall edema. No evidence of abscess. Small hiatal hernia. Atherosclerotic disease. Status post cholecystectomy. RECOMMENDATIONS:   2.6 cm incidental left thyroid nodule. Recommend thyroid US. Reference: J Am Rony Radiol. 2015 Feb;12(2): 143-50         VL LOWER EXTREMITY ARTERIAL SEGMENTAL PRESSURES W PPG    (Results Pending)   MRI FOOT LEFT WO CONTRAST    (Results Pending)   XR CHEST (2 VW)    (Results Pending)       Assessment:  Principal Problem:    Elevated troponin  Active Problems:    Diabetes ([address]    Open wound of right foot    Open wound of left foot    Acute kidney injury ([address]    Bacteremia    UTI (urinary tract infection)  Resolved Problems:    * No resolved hospital problems. *      Plan:  1. Elevate troponin, possible NSTEMI:  Troponin 58->.83->112->99. EKG with nonspecific ST changes. Cardiology following. Echo pending. Ischemic evaluation with lexiscan stress test once abdominal symptoms have resolved. Continue betablocker and aspirin. 2. Nausea, vomiting, abdominal pain:  GI following. Continue PPI. For possible gastric emptying study and EGD. 3. Acute respiratory failure with hypoxia: Patient is requiring 4 liters of oxygen and does not wear oxygen at home. CT of the cervical spine showed a ground glass opacity in the right lung apex. CT of the chest with interlobular spetal thickening and mild dependent atelectasis. Respiratory film array was negative. Check chest xray due to adventitious breath sounds and continued need for oxygen. 4. Bacteremia:  Blood cultures are growing Gram positive cocci in clusters. Start Vancomycin. Consult ID.   5. Urinary tract infection:  Urine culture growing Staphylococcus aureus. Vancomycin started. 6. Bilateral foot wounds:  Podiatry following. Xray of the right foot with no evidence of osteomyelitis or subcutaneous gas. Xray of the left foot with findings of new amputation distal aspect of the distal phalanx of the 1st digit suggestive acute or chronic osteomyelitis, new flattening of plantar arch and inferior subluxation of talus  Patient admits to picking at the skin. Arterial studies pending. MRI ordered. 7. Diabetes:  Continue Humalog sliding scale. Hemoglobin A1C is pending. 8. Acute kidney injury:  Creatinine was 0.7 on admission and is now 1.5. Urine output was only 125 ml in the past 24 hours. She has a [place] catheter. Ultrasound of the abdomen was done on 11/19/21 with no hydronephrosis. 9. L2 vertebral body fracture:  MRI showed oblique fracture of the anterior portion of the L2 vertebral body with mild compression, acute vs subacute, possible mild central canal stenosis at L1-2. Orthopedic surgery consulted and said that patient is high risk for kyphoplasty due to BMI and risk of cement extravasation of fracture lines and recommend conservative treatment. 10. Frequent falls:  CT of the head showed small right posterior parietal/occipital scalp contusion. 11. Thyroid nodule  CT showed an incidental finding of a 2.6 cm left thyroid nodule. Enlarged left thyroid lobe. Will need outpatient thyroid ultrasound. NOTE: This report was transcribed using voice recognition software. Every effort was made to ensure accuracy; however, inadvertent computerized transcription errors may be present.      Electronically signed by NICK Schulz CNP on 11/22/2021 at 12:33 PM

## 2021-11-22 NOTE — PROGRESS NOTES
Patient is seen in follow-up for elevated troponin    Subjective:     Ms. Marcos Correa denies any chest pain or shortness of breath  Sitting up in bed no apparent distress    ROS:  CONSTITUTIONAL:  negative for  fevers, chills  HEENT:  negative for earaches, nasal congestion and epistaxis  RESPIRATORY:  negative for  dry cough, cough with sputum,wheezing and hemoptysis  MUSCULOSKELETAL:  negative for  myalgias, arthralgias  NEUROLOGICAL:  negative for visual disturbance, dysphagia    Medication side effects: none    Scheduled Meds:   vancomycin (VANCOCIN) intermittent dosing (placeholder)   Other RX Placeholder    carvedilol  12.5 mg Oral BID WC    atorvastatin  40 mg Oral Nightly    pantoprazole  40 mg IntraVENous BID    And    sodium chloride (PF)  10 mL IntraVENous BID    sodium chloride flush  5-40 mL IntraVENous 2 times per day    enoxaparin  40 mg SubCUTAneous Daily    insulin lispro  0-12 Units SubCUTAneous TID WC    insulin lispro  0-6 Units SubCUTAneous Nightly     Continuous Infusions:   sodium chloride      dextrose      sodium chloride 125 mL/hr at 11/22/21 1407     PRN Meds:perflutren lipid microspheres, sodium chloride flush, sodium chloride, ondansetron **OR** ondansetron, polyethylene glycol, acetaminophen **OR** acetaminophen, glucose, dextrose, glucagon (rDNA), dextrose, HYDROcodone 5 mg - acetaminophen    I/O last 3 completed shifts: In: 2898 [P.O.:420; I.V.:1165]  Out: 100 [Urine:100]  No intake/output data recorded.       Objective:      Physical Exam:   BP (!) 112/58   Pulse 67   Temp 98.4 °F (36.9 °C) (Oral)   Resp 18   Ht 5' 7.5\" (1.715 m)   Wt (!) 317 lb (143.8 kg)   SpO2 97%   BMI 48.92 kg/m²   CONSTITUTIONAL:  awake, alert, cooperative, no apparent distress, and appears stated age  HEAD:  normocepalic, without obvious abnormality, atraumatic  NECK:  Supple, symmetrical, trachea midline, no adenopathy, thyroid symmetric, not enlarged and no tenderness, skin normal  LUNGS:  No increased work of breathing, No accessory muscle use or intercostal retractions, good air exchange, clear to auscultation bilaterally, no crackles or wheezing  CARDIOVASCULAR:  Normal apical impulse, regular rate and rhythm, normal S1 and S2, no S3 or S4, 2 out of 6 systolic murmur at the apex, 2 out of 6 systolic murmur at the left lower sternal border, + edema, no JVD, no carotid bruit. ABDOMEN:  Soft, nontender, no masses, no hepatomegaly, no splenomegaly, BS+  MUSCULOSKELETAL:  No clubbing no cyanosis. there is no redness, warmth, or swelling of the joints  NEUROLOGIC:  Alert, awake,oriented x3  SKIN:  no bruising or bleeding, normal skin color, texture, turgor and no redness, warmth, or swelling      Cardiographics  I personally reviewed the telemetry monitor strips with the following interpretation: Sinus rhythm    Echocardiogram: Not done    Imaging  VL LOWER EXTREMITY ARTERIAL SEGMENTAL PRESSURES W PPG   Final Result   Findings consistent with mild arterial stenosis in the left lower extremity. PVR waveforms are suggestive of small caliber arterial disease in the   bilateral feet. XR FOOT LEFT (MIN 3 VIEWS)   Final Result   1. New amputation of distal aspect of distal phalanx of 1st digit suggesting   acute or chronic osteomyelitis. 2. New flattening of plantar arch and inferior subluxation of talus in   relation to navicular. 3. No subcutaneous gas. XR FOOT RIGHT (2 VIEWS)   Final Result   No evidence of osteomyelitis or subcutaneous gas associated with the right   foot. MRI LUMBAR SPINE WO CONTRAST   Final Result   1. Oblique fracture of the anterior portion of the L2 vertebral body with   mild compression as described above. This could be acute or subacute. The   patient should return for completion of the examination when clinically   feasible. 2. Degenerative change. Changes of ankylosing spondylitis in the lower   thoracic spine.   Possible mild central canal stenosis at L1-2.         US ABDOMEN LIMITED Specify organ? LIVER, PANCREAS, SPLEEN, APPENDIX   Final Result   Unremarkable right upper quadrant ultrasound. CT Head WO Contrast   Final Result   No acute intracranial abnormality. Small right posterior parietal/occipital scalp contusion. CT Cervical Spine WO Contrast   Final Result   No traumatic injuries in cervical spine. Focal 2 cm left thyroid nodule. Follow-up evaluation with outpatient thyroid   ultrasound may be obtained if for further evaluation. Ground-glass opacity in right lung apex. Follow-up separate CT chest report   for further details. CT ABDOMEN PELVIS W IV CONTRAST Additional Contrast? None   Final Result   CHEST:      No acute traumatic abnormality is evident in the chest.      Left thyroid nodule. See recommendation below. Enlarged main pulmonary artery, which can be seen in the setting of pulmonary   arterial hypertension. Mild cardiomegaly. Findings suggestive of pulmonary vascular congestion. Findings suggestive of ankylosing spondylitis. ABDOMEN/PELVIS:      Fracture of L2 vertebral body involving the anterior column. No significant   height loss or retropulsion. No evidence of solid or hollow organ injury. Nonspecific skin thickening in the lower anterior abdominal wall which may   represent cellulitis or simple body wall edema. No evidence of abscess. Small hiatal hernia. Atherosclerotic disease. Status post cholecystectomy. RECOMMENDATIONS:   2.6 cm incidental left thyroid nodule. Recommend thyroid US. Reference: J Am Rony Radiol. 2015 Feb;12(2): 143-50         CT CHEST WO CONTRAST   Final Result   CHEST:      No acute traumatic abnormality is evident in the chest.      Left thyroid nodule. See recommendation below. Enlarged main pulmonary artery, which can be seen in the setting of pulmonary   arterial hypertension.       Mild cardiomegaly. Findings suggestive of pulmonary vascular congestion. Findings suggestive of ankylosing spondylitis. ABDOMEN/PELVIS:      Fracture of L2 vertebral body involving the anterior column. No significant   height loss or retropulsion. No evidence of solid or hollow organ injury. Nonspecific skin thickening in the lower anterior abdominal wall which may   represent cellulitis or simple body wall edema. No evidence of abscess. Small hiatal hernia. Atherosclerotic disease. Status post cholecystectomy. RECOMMENDATIONS:   2.6 cm incidental left thyroid nodule. Recommend thyroid US. Reference: J Am Rony Radiol. 2015 Feb;12(2): 143-50         MRI FOOT LEFT WO CONTRAST    (Results Pending)   XR CHEST PORTABLE    (Results Pending)       Lab Review   Lab Results   Component Value Date     11/22/2021    K 4.0 11/22/2021    CL 98 11/22/2021    CO2 22 11/22/2021    BUN 27 11/22/2021    CREATININE 1.5 11/22/2021    GLUCOSE 69 11/22/2021    GLUCOSE 134 11/05/2011    CALCIUM 7.6 11/22/2021     Lab Results   Component Value Date    WBC 14.4 11/22/2021    HGB 9.0 11/22/2021    HCT 28.1 11/22/2021    MCV 89.5 11/22/2021     11/22/2021     I have personally reviewed the laboratory, cardiac diagnostic and radiographic testing as outlined above:    Assessment:     1. Elevated troponin: Secondary to comorbid conditions, doubt ACS type I  2. Compression fracture of L2    Recommendations:     1. Continue current treatment  2. No further cardiac work-up is planned at this point of time    No active cardiac problems, will see as needed, thank you    Discussed with patient  Electronically signed by Dixie Roblero MD on 11/22/2021 at 5:27 PM  NOTE: This report was transcribed using voice recognition software.  Every effort was made to ensure accuracy; however, inadvertent computerized transcription errors may be present

## 2021-11-22 NOTE — CONSULTS
[facility]  Consult Note    [address] [facility] [address]  [place], [address]  Phone [phone]   [phone]      Admit Date: 2021  9:04 AM  Pt Name: Violet Soriano  MRN: [id number]  : 1950  Reason for Consult:    Chief Complaint   Patient presents with   Jack Guadalajajuan Fall     this is second fall this am, some back pain, slight incontinence per EMS, patient has scratched her legs/ankles raw per EMS, patient unable to get up from fall and states she does not remember what happened. Requesting Physician:  Mookie Berman MD  PCP: No primary care provider on file. History Obtained From:  patient, chart   ID consulted for Elevated troponin [R77.8]  Compression fracture of L2 vertebra, initial encounter ([facility] [address] [S32.020A]  on hospital day 19 Mónica Liao       Chief Complaint   Patient presents with   Jack Gusandiplajajuan Fall     this is second fall this am, some back pain, slight incontinence per EMS, patient has scratched her legs/ankles raw per EMS, patient unable to get up from fall and states she does not remember what happened. HISTORYOF PRESENT ILLNESS   Violet Soriano is a 79 y.o. female who presents with   has a past medical history of Back pain and Eczema.    ED TRIAGEVITALS  BP: (!) 128/57, Temp: 98.1 °F (36.7 °C), Pulse: 77, Resp: 18, SpO2: 95 %  HPI  Pt came in with falls   She had abd pain/nausea/uti sx  Weakness  covid screen neg   Wbc14.8 cr0.7   Ct  Head   Ct a/p  Podiatry following for bilateral full-thickness ulcers down to level of exposed deep fascia,  Cardiology Elevated troponin  Gi following for abd pain, n/v, h/h  Id is asked to see for bacteremia     Pt in bed  present  Pt is lethargic but briefly arouses  Had Moderna vaccine x1 today   REVIEW OF SYSTEMS     CONSTITUTIONAL:   As in hpi     Medications Prior to Admission: ibuprofen (ADVIL;MOTRIN) 200 MG tablet, Take 400 mg by mouth every 6 hours as needed for Pain   Cinnamon 500 MG 0-12 Units, 0-12 Units, SubCUTAneous, TID WC, Kenisha Rosales MD, 2 Units at 11/21/21 1718    insulin lispro (HUMALOG) injection vial 0-6 Units, 0-6 Units, SubCUTAneous, Nightly, Kenisha Rosales MD, 1 Units at 11/21/21 2134    glucose (GLUTOSE) 40 % oral gel 15 g, 15 g, Oral, PRN, Kenisha Rosales MD    dextrose 50 % IV solution, 12.5 g, IntraVENous, PRN, Kenisha Rosales MD    glucagon (rDNA) injection 1 mg, 1 mg, IntraMUSCular, PRN, Kenisha Rosales MD    dextrose 5 % solution, 100 mL/hr, IntraVENous, PRN, Kenisha Rosales MD    0.9 % sodium chloride infusion, , IntraVENous, Continuous, Kenisha Rosales MD, Last Rate: 125 mL/hr at 11/22/21 1407, New Bag at 11/22/21 1407    HYDROcodone-acetaminophen (NORCO) 5-325 MG per tablet 1 tablet, 1 tablet, Oral, Q6H PRN, Lisette Avilez MD, 1 tablet at 11/22/21 1407  ALLERGIES     Patient has no known allergies. Immunization History   Administered Date(s) Administered    COVID-19, Berton Gals, Primary or Immunocompromised, PF, 100mcg/0.5mL 11/22/2021      Internal Administration   First Dose COVID-19, Moderna, Primary or Immunocompromised, PF, 100mcg/0.5mL  11/22/2021   Second Dose           Last COVID Lab SARS-CoV-2, PCR (no units)   Date Value   11/20/2021 Not Detected     SARS-CoV-2, NAAT (no units)   Date Value   11/19/2021 Not Detected            PAST MEDICAL HISTORY     Past Medical History:   Diagnosis Date    Back pain     Eczema      SURGICAL HISTORY     History reviewed. No pertinent surgical history. FAMILY HISTORY     History reviewed. No pertinent family history.   SOCIAL HISTORY       Social History     Socioeconomic History    Marital status:      Spouse name: None    Number of children: None    Years of education: None    Highest education level: None   Occupational History    None   Tobacco Use    Smoking status: Never Smoker    Smokeless tobacco: Never Used   Substance and Sexual Activity    Alcohol use: Never    Drug use: Never    Sexual activity: Not Currently   Other Topics Concern    None   Social History Narrative    None     Social Determinants of Health     Financial Resource Strain:     Difficulty of Paying Living Expenses: Not on file   Food Insecurity:     Worried About Running Out of Food in the Last Year: Not on file    Akash of Food in the Last Year: Not on file   Transportation Needs:     Lack of Transportation (Medical): Not on file    Lack of Transportation (Non-Medical):  Not on file   Physical Activity:     Days of Exercise per Week: Not on file    Minutes of Exercise per Session: Not on file   Stress:     Feeling of Stress : Not on file   Social Connections:     Frequency of Communication with Friends and Family: Not on file    Frequency of Social Gatherings with Friends and Family: Not on file    Attends Mosque Services: Not on file    Active Member of Clubs or Organizations: Not on file    Attends Club or Organization Meetings: Not on file    Marital Status: Not on file   Intimate Partner Violence:     Fear of Current or Ex-Partner: Not on file    Emotionally Abused: Not on file    Physically Abused: Not on file    Sexually Abused: Not on file   Housing Stability:     Unable to Pay for Housing in the Last Year: Not on file    Number of [place] in the Last Year: Not on file    Unstable Housing in the Last Year: Not on file     ·     [address]       ED Triage Vitals [11/19/21 0916]   BP Temp Temp Source Pulse Resp SpO2 Height Weight   (!) 227/99 99 °F (37.2 °C) Oral 107 18 95 % 5' 7.5\" (1.715 m) 300 lb (136.1 kg)     Vitals:    Vitals:    11/21/21 2018 11/22/21 0400 11/22/21 0430 11/22/21 0830   BP: (!) 116/55 115/61  (!) 128/57   Pulse: 96 83  77   Resp: 20 20  18   Temp: 99.5 °F (37.5 °C) 98.8 °F (37.1 °C)  98.1 °F (36.7 °C)   TempSrc: Oral Oral  Oral   SpO2: 93% 95%  95%   Weight:   (!) 317 lb (143.8 kg)    Height:         Physical Exam   Constitutional/General: asleep, NAD  Head: NC/AT  Eyes: PERRL, EOMI no petechia   Mouth: Normal mucosa, no thrush   Neck: Supple, full ROM,    Pulmonary: Lungs dec to auscultation bilaterally. Not in respiratory distress on o2  Cardiovascular:  Regular rate and rhythm, no murmurs, gallops, or rubs. Abdomen: Soft, + BS  distension. Nontender. inc bmi   Extremities: Moves all extremities x 4. Warm and well perfused ble edema aced pictures viewed  Pulses:  Distal pulses dec  Skin: Warm and dry without rash  Neurologic:    No focal deficits answer questions  Psych: Normal Affect     DIAGNOSTIC RESULTS   RADIOLOGY:   XR FOOT RIGHT (2 VIEWS)    Result Date: 11/20/2021  EXAMINATION: TWO XRAY VIEWS OF THE RIGHT FOOT 11/20/2021 2:10 pm COMPARISON: None. HISTORY: ORDERING SYSTEM PROVIDED HISTORY: Wounds TECHNOLOGIST PROVIDED HISTORY: Patient has extensive back pain and is unable to move from bed. If possible please perform bedside Reason for exam:->Wounds FINDINGS: No fracture or dislocation of the right foot. Distal interphalangeal joint of 2nd digit is in flexion. No bony erosive changes. There is no subcutaneous gas. Degenerative osteophytosis along superior margin of the tarsal bones. There is plantar calcaneal spurring. Vascular calcifications are present. No evidence of osteomyelitis or subcutaneous gas associated with the right foot. XR FOOT LEFT (MIN 3 VIEWS)    Result Date: 11/20/2021  EXAMINATION: THREE XRAY VIEWS OF THE LEFT FOOT 11/20/2021 2:10 pm COMPARISON: August 28, 2008 HISTORY: ORDERING SYSTEM PROVIDED HISTORY: Wounds TECHNOLOGIST PROVIDED HISTORY: Patient has extensive back pain and is unable to move from bed. If possible please perform bedside Reason for exam:->Wounds FINDINGS: There is amputation of distal aspect of distal phalanx of the 1st digit. No evidence of fracture. There is inferior subluxation of talus in relation to the navicular which is new compared to prior and flattening of plantar arch.  There is plantar calcaneal spurring. Vascular calcifications are present. No subcutaneous gas. 1. New amputation of distal aspect of distal phalanx of 1st digit suggesting acute or chronic osteomyelitis. 2. New flattening of plantar arch and inferior subluxation of talus in relation to navicular. 3. No subcutaneous gas. CT Head WO Contrast    Result Date: 11/19/2021  EXAMINATION: CT OF THE HEAD WITHOUT CONTRAST  11/19/2021 1:21 pm TECHNIQUE: CT of the head was performed without the administration of intravenous contrast. Dose modulation, iterative reconstruction, and/or weight based adjustment of the mA/kV was utilized to reduce the radiation dose to as low as reasonably achievable. COMPARISON: None. HISTORY: ORDERING SYSTEM PROVIDED HISTORY: fall TECHNOLOGIST PROVIDED HISTORY: Reason for exam:->fall Has a \"code stroke\" or \"stroke alert\" been called? ->No Decision Support Exception - unselect if not a suspected or confirmed emergency medical condition->Emergency Medical Condition (MA) FINDINGS: The ventricles are normal size, position and contour. There are no masses or mass effect. There are no parenchymal hemorrhages or extra-axial fluid collections. No definite acute CVA. The cerebellum and brainstem are normal.  There is mucosal thickening of the maxillary sinuses. The mastoid air cells are clear. Osseous calvarium is normal.  There is a small area of increased density posterior parietal/occipital scalp just right of midline. No acute intracranial abnormality. Small right posterior parietal/occipital scalp contusion. CT CHEST WO CONTRAST    Result Date: 11/19/2021  EXAMINATION: CT OF THE CHEST WITHOUT CONTRAST; CT OF THE ABDOMEN AND PELVIS WITH CONTRAST 11/19/2021 10:21 am TECHNIQUE: CT of the chest was performed without the administration of intravenous contrast. Multiplanar reformatted images are provided for review.  Dose modulation, iterative reconstruction, and/or weight based adjustment of the mA/kV was utilized to reduce the radiation dose to as low as reasonably achievable.; CT of the abdomen and pelvis was performed with the administration of intravenous contrast. Multiplanar reformatted images are provided for review. Dose modulation, iterative reconstruction, and/or weight based adjustment of the mA/kV was utilized to reduce the radiation dose to as low as reasonably achievable. COMPARISON: None. HISTORY: ORDERING SYSTEM PROVIDED HISTORY: rib pain TECHNOLOGIST PROVIDED HISTORY: Reason for exam:->rib pain Decision Support Exception - unselect if not a suspected or confirmed emergency medical condition->Emergency Medical Condition (MA) FINDINGS: Limited noncontrast technique in the setting of trauma. Allowing for this limitation, findings are as follows: CHEST: Left thyroid nodule measuring 2.6 cm. Enlarged left thyroid lobe. No evidence of mediastinal hematoma. Enlarged main pulmonary artery measuring 36 mm. Coronary atherosclerosis. No pericardial effusion. No pleural effusion or pneumothorax. Interlobular septal thickening (smooth) in both lungs. Mild dependent atelectasis in the upper lungs predominantly. Fullness of the bilateral roslyn. The central airways are patent. Findings of ankylosing spondylitis. Degenerative changes of the right shoulder. Old appearing bilateral anterolateral rib fractures and old appearing right posterior rib fractures. No acute appearing or displaced rib fractures are evident. ABDOMEN/PELVIS: Curvilinear artifact projecting over the left lateral abdomen/pelvis, possibly a detector related artifact. No free air or significant free fluid. No evidence of bowel obstruction. Nonvisualized appendix, however there are no right lower quadrant inflammatory changes to suggest appendicitis. Small hiatal hernia. No acute traumatic abnormality of the liver, pancreas, spleen, adrenal glands, or kidneys. Status post cholecystectomy. No unexpected biliary dilatation.   No evidence of hydronephrosis or pyelonephritis. Subcentimeter low-density foci in the kidneys are too small to accurately characterize. Unremarkable appearance of the bladder and uterus. Left adnexal calcification. No abdominal aortic aneurysm or evidence of dissection. Atherosclerotic disease noted. Skin thickening in the lower anterior abdominal wall, without soft tissue gas or abscess. Fracture of the L2 vertebral body involving the anterior column and transversely oriented in the midportion of the vertebral body. No significant vertebral height loss. Findings suggestive ankylosing spondylitis again noted. CHEST: No acute traumatic abnormality is evident in the chest. Left thyroid nodule. See recommendation below. Enlarged main pulmonary artery, which can be seen in the setting of pulmonary arterial hypertension. Mild cardiomegaly. Findings suggestive of pulmonary vascular congestion. Findings suggestive of ankylosing spondylitis. ABDOMEN/PELVIS: Fracture of L2 vertebral body involving the anterior column. No significant height loss or retropulsion. No evidence of solid or hollow organ injury. Nonspecific skin thickening in the lower anterior abdominal wall which may represent cellulitis or simple body wall edema. No evidence of abscess. Small hiatal hernia. Atherosclerotic disease. Status post cholecystectomy. RECOMMENDATIONS: 2.6 cm incidental left thyroid nodule. Recommend thyroid US. Reference: J Am Rony Radiol. 2015 Feb;12(2): 143-50     CT Cervical Spine WO Contrast    Result Date: 11/19/2021  EXAMINATION: CT OF THE CERVICAL SPINE WITHOUT CONTRAST 11/19/2021 1:21 pm TECHNIQUE: CT of the cervical spine was performed without the administration of intravenous contrast. Multiplanar reformatted images are provided for review. Dose modulation, iterative reconstruction, and/or weight based adjustment of the mA/kV was utilized to reduce the radiation dose to as low as reasonably achievable.  COMPARISON: None. HISTORY: ORDERING SYSTEM PROVIDED HISTORY: fall TECHNOLOGIST PROVIDED HISTORY: Reason for exam:->fall Decision Support Exception - unselect if not a suspected or confirmed emergency medical condition->Emergency Medical Condition (MA) FINDINGS: BONES/ALIGNMENT: There is no acute fracture or traumatic malalignment. DEGENERATIVE CHANGES: Degenerative disc disease most prominent at C5-C6 and C6-C7 with uncovertebral arthropathy. There is multilevel facet arthropathy seen throughout the cervical spine. SOFT TISSUES: There is no prevertebral soft tissue swelling. There is a 2.0 cm left thyroid nodule. Partially visualized patchy ground-glass opacity identified in right lung apex. No traumatic injuries in cervical spine. Focal 2 cm left thyroid nodule. Follow-up evaluation with outpatient thyroid ultrasound may be obtained if for further evaluation. Ground-glass opacity in right lung apex. Follow-up separate CT chest report for further details. MRI LUMBAR SPINE WO CONTRAST    Result Date: 11/20/2021  EXAMINATION: MRI OF THE LUMBAR SPINE WITHOUT CONTRAST, 11/20/2021 8:56 am TECHNIQUE: Multiplanar multisequence MRI of the lumbar spine was performed without the administration of intravenous contrast. COMPARISON: CT abdomen and pelvis dated 11/19/2021 HISTORY: ORDERING SYSTEM PROVIDED HISTORY: lumbar pain TECHNOLOGIST PROVIDED HISTORY: Reason for exam:->lumbar pain FINDINGS: The patient terminated the study prematurely due to severe pain and only sagittal T1 and T2 weighted images are available for review. BONES/ALIGNMENT: There is normal alignment of the spine. There is an oblique fracture extending through the anterior portion of the L2 vertebral body from the anterior cortical surface to the superior endplate. There is mild loss of height of about 25% with some loss of marrow signal.  Findings could represent an acute/subacute fracture.   No STIR images are available to assess for the amount of marrow edema. Otherwise, the vertebral body heights are maintained. SPINAL CORD: The conus terminates normally. SOFT TISSUES: No paraspinal mass identified. DEGENERATIVE CHANGE: No axial images are available to assess the disc space levels. There is disc bulge and central protrusion at L1-2 that may cause mild central canal stenosis. Otherwise, no definite significant central canal stenosis, disc herniation or significant neural foraminal narrowing is seen. There is spurring with disc desiccation in all the lumbar discs. Disc space narrowing is noted, most severe at L5-S1 and moderate to severe at L2-3. Changes of ankylosing spondylitis seen in the visualized lower thoracic spine. 1. Oblique fracture of the anterior portion of the L2 vertebral body with mild compression as described above. This could be acute or subacute. The patient should return for completion of the examination when clinically feasible. 2. Degenerative change. Changes of ankylosing spondylitis in the lower thoracic spine. Possible mild central canal stenosis at L1-2. CT ABDOMEN PELVIS W IV CONTRAST Additional Contrast? None    Result Date: 11/19/2021  EXAMINATION: CT OF THE CHEST WITHOUT CONTRAST; CT OF THE ABDOMEN AND PELVIS WITH CONTRAST 11/19/2021 10:21 am TECHNIQUE: CT of the chest was performed without the administration of intravenous contrast. Multiplanar reformatted images are provided for review. Dose modulation, iterative reconstruction, and/or weight based adjustment of the mA/kV was utilized to reduce the radiation dose to as low as reasonably achievable.; CT of the abdomen and pelvis was performed with the administration of intravenous contrast. Multiplanar reformatted images are provided for review. Dose modulation, iterative reconstruction, and/or weight based adjustment of the mA/kV was utilized to reduce the radiation dose to as low as reasonably achievable. COMPARISON: None.  HISTORY: ORDERING SYSTEM PROVIDED HISTORY: rib pain TECHNOLOGIST PROVIDED HISTORY: Reason for exam:->rib pain Decision Support Exception - unselect if not a suspected or confirmed emergency medical condition->Emergency Medical Condition (MA) FINDINGS: Limited noncontrast technique in the setting of trauma. Allowing for this limitation, findings are as follows: CHEST: Left thyroid nodule measuring 2.6 cm. Enlarged left thyroid lobe. No evidence of mediastinal hematoma. Enlarged main pulmonary artery measuring 36 mm. Coronary atherosclerosis. No pericardial effusion. No pleural effusion or pneumothorax. Interlobular septal thickening (smooth) in both lungs. Mild dependent atelectasis in the upper lungs predominantly. Fullness of the bilateral roslyn. The central airways are patent. Findings of ankylosing spondylitis. Degenerative changes of the right shoulder. Old appearing bilateral anterolateral rib fractures and old appearing right posterior rib fractures. No acute appearing or displaced rib fractures are evident. ABDOMEN/PELVIS: Curvilinear artifact projecting over the left lateral abdomen/pelvis, possibly a detector related artifact. No free air or significant free fluid. No evidence of bowel obstruction. Nonvisualized appendix, however there are no right lower quadrant inflammatory changes to suggest appendicitis. Small hiatal hernia. No acute traumatic abnormality of the liver, pancreas, spleen, adrenal glands, or kidneys. Status post cholecystectomy. No unexpected biliary dilatation. No evidence of hydronephrosis or pyelonephritis. Subcentimeter low-density foci in the kidneys are too small to accurately characterize. Unremarkable appearance of the bladder and uterus. Left adnexal calcification. No abdominal aortic aneurysm or evidence of dissection. Atherosclerotic disease noted. Skin thickening in the lower anterior abdominal wall, without soft tissue gas or abscess.  Fracture of the L2 vertebral body involving the anterior column and transversely oriented in the midportion of the vertebral body. No significant vertebral height loss. Findings suggestive ankylosing spondylitis again noted. CHEST: No acute traumatic abnormality is evident in the chest. Left thyroid nodule. See recommendation below. Enlarged main pulmonary artery, which can be seen in the setting of pulmonary arterial hypertension. Mild cardiomegaly. Findings suggestive of pulmonary vascular congestion. Findings suggestive of ankylosing spondylitis. ABDOMEN/PELVIS: Fracture of L2 vertebral body involving the anterior column. No significant height loss or retropulsion. No evidence of solid or hollow organ injury. Nonspecific skin thickening in the lower anterior abdominal wall which may represent cellulitis or simple body wall edema. No evidence of abscess. Small hiatal hernia. Atherosclerotic disease. Status post cholecystectomy. RECOMMENDATIONS: 2.6 cm incidental left thyroid nodule. Recommend thyroid US. Reference: J Am Rony Radiol. 2015 Feb;12(2): 143-50     US ABDOMEN LIMITED Specify organ? LIVER, PANCREAS, SPLEEN, APPENDIX    Result Date: 11/19/2021  EXAMINATION: RIGHT UPPER QUADRANT ULTRASOUND 11/19/2021 9:36 pm COMPARISON: None. HISTORY: ORDERING SYSTEM PROVIDED HISTORY: abdominal pain TECHNOLOGIST PROVIDED HISTORY: Reason for exam:->abdominal pain Specify organ?->LIVER Specify organ?->PANCREAS Specify organ?->SPLEEN Specify organ?->APPENDIX What reading provider will be dictating this exam?->CRC FINDINGS: LIVER:  The liver demonstrates normal echogenicity without evidence of intrahepatic biliary ductal dilatation. BILIARY SYSTEM:  Cholecystectomy. Common bile duct is within normal limits measuring . RIGHT KIDNEY AND LEFT KIDNEY: The kidneys are grossly unremarkable without evidence of hydronephrosis. PANCREAS:  Visualized portions of the pancreas are unremarkable. OTHER: No evidence of right upper quadrant ascites. Spleen is not well visualized.      Unremarkable right upper quadrant ultrasound. LABS  Recent Labs     11/20/21  1357 11/21/21  1340 11/22/21  0505   WBC 15.7* 13.0* 14.4*   HGB 10.6* 9.8* 9.0*   HCT 33.5* 31.2* 28.1*   MCV 89.3 89.1 89.5    164 143     Recent Labs     11/20/21  1357 11/20/21  1357 11/21/21  1340 11/21/21  1340 11/22/21  0505     --  132  --  132   K 3.9   < > 4.5   < > 4.0   CL 97*   < > 99   < > 98   CO2 22   < > 24   < > 22   BUN 16  --  19  --  27*   CREATININE 0.8  --  0.9  --  1.5*   GFRAA >60  --  >60  --  41   LABGLOM >60  --  >60  --  34   GLUCOSE 222*  --  203*  --  69*   CALCIUM 7.8*  --  7.7*  --  7.6*    < > = values in this interval not displayed.      Recent Labs     11/22/21  0505   PROCAL 4.70*     No results found for: CRP  No results found for: SEDRATE  No results found for: SILGLOL2S3  Lab Results   Component Value Date    COVID19 Not Detected 11/20/2021     COVID-19/VANDANA-COV2 LABS  Recent Labs     11/21/21  1340 11/22/21  0505   PROCAL  --  4.70*   TRIG 109  --      Lab Results   Component Value Date    CHOL 98 11/21/2021    TRIG 109 11/21/2021    HDL 25 11/21/2021    LDLCALC 51 11/21/2021    LABVLDL 22 11/21/2021     MICROBIOLOGY:     Cultures :   Lab Results   Component Value Date    [facility]  11/20/2021     Gram stain performed from blood culture bottle media  Gram positive cocci in clusters      [facility] Staphylococcus aureus 11/21/2021     Lab Results   Component Value Date    BLOODCULT2  11/20/2021     Gram stain performed from blood culture bottle media  Gram positive cocci in clusters      ORG Staphylococcus aureus 11/21/2021          Urine Culture, Routine   Date Value Ref Range Status   11/21/2021 >100,000 CFU/ml  Sensitivity to follow    Preliminary          FINAL IMPRESSION    Patient is a 79 y.o. female who presented with   Chief Complaint   Patient presents with    Fall     this is second fall this am, some back pain, slight incontinence per EMS, patient has scratched her legs/ankles raw per EMS, patient unable to get up from fall and states she does not remember what happened. and admitted for Elevated troponin [R77.8]  Compression fracture of L2 vertebra, initial encounter ([facility] [address] [V23.108X]     COMPLICATED Staphylococcus aureus Abnormal  bacteruria with Gram positive cocci in clusters   Poss from dfu -  XRY no acute findings right lower extremity, acute versus chronic osteomyelitis left hallux distal phalanx with no subQ gas  MRI left foot ordered and pending 11/21/2021  -Arterial studies ordered and pending 11/20/2021  -Culture collected and sent 11/22/2021 of left hallux  POSS SURGERY   evgeny   F/u blood cx          vancomycin 2000 mg in dextrose 5% 500 ml IVPB, Once  vancomycin (VANCOCIN) intermittent dosing (placeholder), RX Placeholder     Will need echo esr/crp     Imaging and labs were reviewed per medical records and any ID pertinent labs were addressed with the patient. The patient/FAMILY  was educated about the diagnosis, prognosis, indications, risks and benefits of treatment. An opportunity to ask questions was given to the patient/FAMILY and questions were answered. Thank you for involving me in the care of [facility] [employer]. Please do not hesitate to call for any questions or concerns.          Electronically signed by Tristin Gonzalez MD on 11/22/2021 at 3:49 PM

## 2021-11-22 NOTE — PROGRESS NOTES
Podiatry Progress Note  11/22/2021   Raya Urbano       SUBJECTIVE: Violet Soriano is a 79 y.o. female seen bedside for follow-up of bilateral full-thickness ulcers. X-ray reveals possible osteomyelitis left hallux distal phalanx. MRI left foot pending, being performed right now. Arterial studies are pending, completed but read pending. Resting comfortably bed this a.m. with no new complaints.  at bedside. Surgical mention this week pending MRI findings and arterial study findings. .  They are agreeable. Denies any chest pain or shortness of breath, endorses some abdominal pain with nausea and vomiting that is improved today. Past Medical History:   Diagnosis Date    Back pain     Eczema         History reviewed. No pertinent surgical history. History reviewed. No pertinent family history. Social History     Tobacco Use    Smoking status: Never Smoker    Smokeless tobacco: Never Used   Substance Use Topics    Alcohol use: Never        Prior to Admission medications    Medication Sig Start Date End Date Taking? Authorizing Provider   ibuprofen (ADVIL;MOTRIN) 200 MG tablet Take 400 mg by mouth every 6 hours as needed for Pain    Yes Historical Provider, MD   Cinnamon 500 MG CAPS Take 2 capsules by mouth daily   Yes Historical Provider, MD        Patient has no known allergies. OBJECTIVE:        Vitals:    11/22/21 0830   BP: (!) 128/57   Pulse: 77   Resp: 18   Temp: 98.1 °F (36.7 °C)   SpO2: 95%              EXAM:        Pt is AAOx3    Vascular Exam:  DP and PT pulses nonpalpable bilaterally. CFT sluggish between 3 and 5 s to digits 2 through 5 left, one through 5 right. Unable to assess CFT to left hallux as there is no open wound and hyperkeratotic changes to the skin. Skin temperature from proximal lower leg to distal digits bilaterally is warm to warm.  +2 pitting edema bilateral pretibial, ankle, foot.     Neuro Exam: Diminished protective sensation bilaterally the digits and forefoot. Light touch reestablished to the proximal lower leg.     Dermatologic Exam:    Ulcer #1 left hallux: Full-thickness ulcer down to the level of exposed deep fascia plantarly and medially with large soft tissue deficit to the medial plantar left hallux, partially avulsed nail with lateral aspect of the left hallux nail intact. There is hyperpigmented and hyper keratotic changes to   the left hallux consistent with chronic edema and advanced lichenification. The wound does not probe to bone, there is no exposed muscle or tendon, wound bed has some light serous drainage with no malodor. No undermining, and, no crepitus or fluctuance. There is no proximal streaking.      Ulcer #2, #3 right second and first digits dorsally: Unstageable wounds to the dorsal aspect of the right first and second digits, stable at this time with no active drainage or crepitus or fluctuance. There is no proximal streaking to these wounds, there is no malodor, there is no clinical signs of infection. There are chronic venous lower extremity edema skin changes to the anterior lower leg with thickening and papillomatous changes.     MSK: Muscle strength 5/5 Bilateral . No tenderness on palpation. There is pain with range of motion of the leg secondary to back pain and hip pain.     Current Facility-Administered Medications   Medication Dose Route Frequency Provider Last Rate Last Admin    perflutren lipid microspheres (DEFINITY) injection 1.65 mg  1.5 mL IntraVENous ONCE PRN Sofia Mail, APRN - CNP        carvedilol (COREG) tablet 12.5 mg  12.5 mg Oral BID  Hilario Laura MD   12.5 mg at 11/22/21 0845    atorvastatin (LIPITOR) tablet 40 mg  40 mg Oral Nightly Hilario Laura MD   40 mg at 11/21/21 2135    pantoprazole (PROTONIX) injection 40 mg  40 mg IntraVENous BID Rubens James MD   40 mg at 11/22/21 0801    And    sodium chloride (PF) 0.9 % injection 10 mL  10 mL IntraVENous BID [facility] Katie Vides MD   10 mL at 11/22/21 0802    sodium chloride flush 0.9 % injection 5-40 mL  5-40 mL IntraVENous 2 times per day Ghanshyam Tracey MD   10 mL at 11/22/21 0801    sodium chloride flush 0.9 % injection 5-40 mL  5-40 mL IntraVENous PRN Ghanshyam Tracey MD   10 mL at 11/22/21 1051    0.9 % sodium chloride infusion  25 mL IntraVENous PRN Ghanshyam Tracey MD        enoxaparin (LOVENOX) injection 40 mg  40 mg SubCUTAneous Daily Ghanshyam Tracey MD   40 mg at 11/22/21 0845    ondansetron (ZOFRAN-ODT) disintegrating tablet 4 mg  4 mg Oral Q8H PRN Ghanshyam Tracey MD        Or    ondansetron [facility]) injection 4 mg  4 mg IntraVENous Q6H PRN Ghanshyam Tracey MD   4 mg at 11/20/21 1751    polyethylene glycol (GLYCOLAX) packet 17 g  17 g Oral Daily PRN Ghanshyam Tracey MD        acetaminophen (TYLENOL) tablet 650 mg  650 mg Oral Q6H PRN Ghanshyam Tracey MD   650 mg at 11/20/21 2038    Or    acetaminophen (TYLENOL) suppository 650 mg  650 mg Rectal Q6H PRN Ghanshyam Tracey MD        insulin lispro (HUMALOG) injection vial 0-12 Units  0-12 Units SubCUTAneous TID WC Ghanshyam Tracey MD   2 Units at 11/21/21 1718    insulin lispro (HUMALOG) injection vial 0-6 Units  0-6 Units SubCUTAneous Nightly Ghanshyam Tracey MD   1 Units at 11/21/21 2134    glucose (GLUTOSE) 40 % oral gel 15 g  15 g Oral PRN Ghanshyam Tracey MD        dextrose 50 % IV solution  12.5 g IntraVENous PRN Ghanshyam Tracey MD        glucagon (rDNA) injection 1 mg  1 mg IntraMUSCular PRN Ghanshyam Tracey MD        dextrose 5 % solution  100 mL/hr IntraVENous PRN Ghanshyam Tracey MD        0.9 % sodium chloride infusion   IntraVENous Continuous Ghanshyam Tracey  mL/hr at 11/21/21 2350 New Bag at 11/21/21 2350    HYDROcodone-acetaminophen (NORCO) 5-325 MG per tablet 1 tablet  1 tablet Oral Q6H PRN Monalisa Ace MD   1 tablet at 11/21/21 0802        Lab Results   Component Value Date    WBC 14.4 (H) 11/22/2021    HCT 28.1 (L) 11/22/2021    HGB 9.0 (L) 11/22/2021     11/22/2021     11/22/2021    K 4.0 11/22/2021    CL 98 11/22/2021    CO2 22 11/22/2021    BUN 27 (H) 11/22/2021    CREATININE 1.5 (H) 11/22/2021    GLUCOSE 69 (L) 11/22/2021         ASSESSMENT:  -Full-thickness ulcer down to level of exposed deep fascia, POA, not infected  -Type 2 diabetes mellitus with peripheral neuropathy  -Uncontrolled untreated diabetes with possible DKA     PLAN:  - Examined and evaluated  - All labs, imaging, and charts reviewed   - WBC 14.4  -X-rays bilateral feet 11/20/2021 reveals no acute findings right lower extremity, acute versus chronic osteomyelitis left hallux distal phalanx with no subQ gas, flattened plantar arch with subluxation of talus in relation to the navicular. -MRI left foot ordered and pending 11/21/2021  -Arterial studies ordered and pending 11/20/2021  -Culture collected and sent 11/22/2021 of left hallux  -Dressings consisted of Xeroform DSD bilaterally.  -Pending my left foot and arterial study findings will consider surgical invention. Local wound care for now   -We will continue to follow    D/W:   Bill Conrad DPM FACFAS  Fellowship-Trained Foot and Ankle Surgeon  Diplomate, American Board of Foot and Ankle Surgeons  [phone]       Thank you for involving podiatry in this patients care. Please do not hesitate to call with any questions or concerns.      Dev Richey Podiatry PGY2  11/22/2021   12:22 PM

## 2021-11-22 NOTE — CONSULTS
Nephrology Consult  [facility]  Prosper De Anda MD    CC:       HPI:   Pt is a 80 yo female with a pmh of dm, htn, who presented after falling several times and having abd pain, nausea and vomiting. She did not hit her head. She does not follow with an md and is not on any meds. Her lab show elevated tp, cr 0.7>1.5, na 132, k 4. hgb 9, wbc 14.4, plt 143. She received at ct abd pelvis on 11/19/21 with contrast. There was no hydro. She is on ns at 125 ml/hr. She was started on coreg, lipitor as well. PMH:    Past Medical History:   Diagnosis Date    Back pain     Eczema        Patient Active Problem List   Diagnosis    Elevated troponin    Diabetes ([address]    Open wound of right foot    Open wound of left foot    Acute kidney injury ([address]    Bacteremia    UTI (urinary tract infection)    Non-intractable vomiting    Generalized abdominal pain    Frequent falls    Open wnd of foot    Closed fracture of second lumbar vertebra (HCC)       Meds:     vancomycin  20 mg/kg (Adjusted) IntraVENous Once    vancomycin (VANCOCIN) intermittent dosing (placeholder)   Other RX Placeholder    carvedilol  12.5 mg Oral BID WC    atorvastatin  40 mg Oral Nightly    pantoprazole  40 mg IntraVENous BID    And    sodium chloride (PF)  10 mL IntraVENous BID    sodium chloride flush  5-40 mL IntraVENous 2 times per day    enoxaparin  40 mg SubCUTAneous Daily    insulin lispro  0-12 Units SubCUTAneous TID WC    insulin lispro  0-6 Units SubCUTAneous Nightly        sodium chloride      dextrose      sodium chloride 125 mL/hr at 11/22/21 1407       Meds prn:     perflutren lipid microspheres, sodium chloride flush, sodium chloride, ondansetron **OR** ondansetron, polyethylene glycol, acetaminophen **OR** acetaminophen, glucose, dextrose, glucagon (rDNA), dextrose, HYDROcodone 5 mg - acetaminophen    Meds prior to admission:     No current facility-administered medications on file prior to encounter. Current Outpatient Medications on File Prior to Encounter   Medication Sig Dispense Refill    ibuprofen (ADVIL;MOTRIN) 200 MG tablet Take 400 mg by mouth every 6 hours as needed for Pain       Cinnamon 500 MG CAPS Take 2 capsules by mouth daily         Allergies:    Patient has no known allergies. Social History:     reports that she has never smoked. She has never used smokeless tobacco. She reports that she does not drink alcohol and does not use drugs. Family History:     History reviewed. No pertinent family history.     ROS:     General: no fever, chills   Heent: no nasal congestion, sore throat   Resp: no cough, sob , hemoptysis  Cardiac: no cp , le edema, palpitations  Gi: abd pain, n/v  Gu: no hematuria, dysuria   Neruo: no numbness, weakness, headache, blurry vision   Endocrine:  no h/o dm  Derm: no rash , petechia  Heme: no epistaxis, bruising  All other sx negative     Physical Exam:      Patient Vitals for the past 24 hrs:   BP Temp Temp src Pulse Resp SpO2 Weight   11/22/21 0830 (!) 128/57 98.1 °F (36.7 °C) Oral 77 18 95 % --   11/22/21 0430 -- -- -- -- -- -- (!) 317 lb (143.8 kg)   11/22/21 0400 115/61 98.8 °F (37.1 °C) Oral 83 20 95 % --   11/21/21 2018 (!) 116/55 99.5 °F (37.5 °C) Oral 96 20 93 % --   11/21/21 1718 (!) 156/82 -- -- 100 -- -- --         Intake/Output Summary (Last 24 hours) at 11/22/2021 1518  Last data filed at 11/22/2021 1317  Gross per 24 hour   Intake 1585 ml   Output 100 ml   Net 1485 ml       Constitutional: Patient in no acute distress   Head: normocephalic, atraumatic   Neck: supple, no jvd  Cardiovascular: regular rate and rhythm, no murmurs, gallops, or rubs   Respiratory: Clear, no rales, rhochi, or wheezes,   Gastrointestinal: soft, nontender, nondistended, no hepatosplenomegaly  Ext: no edema  Neuro: aaox3  Skin: dry, no rash   Back: nontender    Data:    Recent Labs     11/20/21  1357 11/21/21  1340 11/22/21  0505   WBC 15.7* 13.0* 14.4*   HGB 10.6* 9.8* 9.0* HCT 33.5* 31.2* 28.1*   MCV 89.3 89.1 89.5    164 143       Recent Labs     11/20/21  1357 11/21/21  1340 11/22/21  0505    132 132   K 3.9 4.5 4.0   CL 97* 99 98   CO2 22 24 22   CREATININE 0.8 0.9 1.5*   BUN 16 19 27*   LABGLOM >60 >60 34   GLUCOSE 222* 203* 69*   CALCIUM 7.8* 7.7* 7.6*       No results found for: VITD25    No results found for: PTH    No results for input(s): ALT, AST, ALKPHOS, BILITOT, BILIDIR in the last 72 hours. No results for input(s): LABALBU in the last 72 hours. No results found for: FERRITIN, IRON, TIBC    No results found for: SHIPZSEY98    No results found for: FOLATE      Lab Results   Component Value Date    COLORU Yellow 11/19/2021    NITRU Negative 11/19/2021    GLUCOSEU 500 11/19/2021    KETUA >=80 11/19/2021    UROBILINOGEN 1.0 11/19/2021    BILIRUBINUR Negative 11/19/2021       No results found for: STEPHENIE, CREURRAN, MACREATRATIO, OSMOU    No components found for: URIC    No results found for: LIPIDPAN      Assessment and Plan:    1. arf  Cr 0.7 on admission now 1.5  Sp dye 11/19/21  Continue ivf  No hyrdro on imaging  Check urine lytes  Daily bmp    2. htn  Follow on coreg    3. Elevated tp    4. Anemia  Gi following    5. armani le wounds  Podiatry seeing    Emma Lozano.  Soraya Uribe MD

## 2021-11-22 NOTE — CARE COORDINATION
Ss note:11/22/2021.10:07 AM Neg covid on 11-. Pt was seen by ED sw on 11-. Pt resides with spouse in apt is unable to do steps to 2nd floor apt. ED  had given SNF list. This sw follow up with pts spouse in room. Pt is currently oor for testing. Spouse relayed  Choices are 1. [facility], 2 [facility], [address] [place] [address]. Stated to follow up with pt once she returns to room. Referral made to [facility], unable to accept pt that is not fully vaccinated. Referral to [place] [id number], awaiting response. NO PRECERT for placement. SW will follow.  NAREN Muniz

## 2021-11-22 NOTE — PROGRESS NOTES
Physical Therapy    Physical Therapy Treatment Note/Plan of Care    Room #:  1217/0737-56  Patient Name: Edenilson Aguero  YOB: 1950  MRN: [id number]    Date of Service: 11/22/2021     Tentative placement recommendation: Subacute rehab  Equipment recommendation: To be determined      Evaluating Physical Therapist: Gildardo Barry, PT  #[id number]      Specific Provider Orders/Date/Referring Provider :  11/19/21 1900   PT eval and treat Start: 11/19/21 1900, End: 11/19/21 1900, ONE TIME, Standing Count: 1 Occurrences, R    Order went unreviewed at transfer on Fri Nov 19, 2021 10:06 PM   Shayla Lobo MD      Admitting Diagnosis:   Elevated troponin [R77.8]  Compression fracture of L2 vertebra, initial encounter ([facility] [S32.020A]    Admitted with    fall and sustained above; pt fell at least 3 times  Surgery: nonenot candidate for kyphoplasty per dr Macrina Garrido note  Visit Diagnoses       Codes    Compression fracture of L2 vertebra, initial encounter [facility])     S32.020A          Patient Active Problem List   Diagnosis    Elevated troponin    [facility]    Open wound of right foot    Open wound of left foot    Acute kidney injury ([facility]    Bacteremia    UTI (urinary tract infection)    Non-intractable vomiting    Generalized abdominal pain    Frequent falls    Open wnd of foot    Closed fracture of second lumbar vertebra ([facility]        ASSESSMENT of Current Deficits Patient exhibits decreased strength, balance, endurance, coordination and pain bilateral knees impairing functional mobility, transfers, gait , gait distance and tolerance to activity are barriers to d/c and require skilled intervention during hospital stay to attain pre hospital level of function. Decreased strength, balance and endurance  increases patient's risk for fall.  Pain limiting patient with function today, only tolerates some supine exercises and needing assist.      PHYSICAL THERAPY  PLAN OF CARE       Physical therapy plan of care is established based on physician order,  patient diagnosis and clinical assessment    Current Treatment Recommendations:    -Bed Mobility: Lower extremity exercises , Upper extremity exercises  and Trunk control activities   -Sitting Balance: Incorporate reaching activities to activate trunk muscles , Hands on support to maintain midline , Facilitate active trunk muscle engagement  and Facilitate postural control in all planes   -Standing Balance: Perform strengthening exercises in standing to promote motor control with or without upper extremity support , Instruct patient on adequate base of support to maintain balance and Challenge balance utilizing reaching  activities beyond center of gravity    -Transfers: Provide instruction on proper hand and foot position for adequate transfer of weight onto lower extremities and use of gait device if needed, Cues for hand placement, technique and safety. Provide stabilization to prevent fall , Support transfer of weight on to lower extremities and Assist with extension of knees trunk and hip to accept weight transfer   -Gait: Gait training, Standing activities to improve: base of support, weight shift, weight bearing , Exercises to improve trunk control and Exercises to improve hip and knee control   -Endurance: Utilize Supervised activities to increase level of endurance to allow for safe functional mobility including transfers and gait     PT long term treatment goals are located in below grid    Patient and or family understand(s) diagnosis, prognosis, and plan of care. Frequency of treatments: Patient will be seen  daily. Prior Level of Function: Patient ambulated with wheeled walker    Rehab Potential: good    for baseline    Past medical history:   Past Medical History:   Diagnosis Date    Back pain     Eczema      History reviewed. No pertinent surgical history.     SUBJECTIVE:    Precautions: Bedrest with bathroom Privileges , falls, alarm and O2 , 3 Liters of o2 via nasal cannula   Social history: Patient lives with spouse in a apartment full flight of steps that she has been unable to [employer] for 3 years    Equipment owned: Cane, Standard Walker and [employer],    AM-PAC Basic Mobility        AM-[facility] Mobility Inpatient   How much difficulty turning over in bed?: A Lot  How much difficulty sitting down on / standing up from a chair with arms?: Unable  How much difficulty moving from lying on back to sitting on side of bed?: A Lot  How much help from another person moving to and from a bed to a chair?: Total  How much help from another person needed to walk in hospital room?: Total  How much help from another person for climbing 3-5 steps with a railing?: Total  AM-PAC Inpatient Mobility Raw Score : 8  AM-PAC Inpatient T-Scale Score : 28.52  Mobility Inpatient CMS 0-100% Score: 86.62  Mobility Inpatient CMS G-Code Modifier : CM    Nursing cleared patient for PT treatment. Patient only agreeable to supine exercises as she is in a lot of pain and is fatigued from morning tests. OBJECTIVE;   Initial Evaluation  Date: 11/21/2021 Treatment Date:    11/22/2021   Short Term/ Long Term   Goals   Was pt agreeable to Eval/treatment? Yes yes To be met in 4 days   Pain level   5/10  back No number assigned to pain, however shouting out with bilateral LE are moved    Bed Mobility    Rolling: Moderate assist of  2    Supine to sit: Moderate assist of  2    Sit to supine: max a of 3    Scooting: Moderate assist of  2   Rolling: Maximal assist of 1   Supine to sit: Not assessed    Sit to supine: Not assessed    Scooting: Not assessed     Rolling: Moderate assist of 1    Supine to sit: Moderate assist of 1    Sit to supine: Moderate assist of 1    Scooting:  Moderate assist of 1     Transfers Sit to stand: attempted however patient too fearful to commit to bearing wt onto bilateral lower ext   Sit to stand: Not assessed       Sit to stand: Maximal assist of  2     ROM impaired d/t redundant tissue and pain in bilateral knees  Increase range of motion 10% of affected joints    Strength BUE:  refer to OT eval  RLE:  2/5  LLE:  2/5  Increase strength in affected mm groups by 1/3 grade   Balance Sitting EOB:  poor posterior lean  Dynamic Standing:  not assessed   Sitting EOB: not assessed   Dynamic Standing: not assessed    Sitting EOB:  fair    Dynamic Standing: poor       Patient is Alert & Oriented x person, place, time and situation and follows one step directions    Sensation:  Patient  denies numbness/tingling   Edema:  yes bilateral lower extremities   Endurance: poor  Fatigues easily    Vitals: 4 liters nasal cannula   Blood Pressure at rest  Blood Pressure during session    Heart Rate at rest   Heart Rate during session     SPO2 at rest  SPO2 during session %      Patient education  Patient educated on role of Physical Therapy, risks of immobility, safety and plan of care,  importance of mobility while in hospital , ankle pumps, quad set and glut set for edema control, blood clot prevention, safety  and positioning for skin integrity and comfort     Patient response to education:   Pt verbalized understanding Pt demonstrated skill Pt requires further education in this area   Yes Partial Yes      Treatment:  Patient practiced and was instructed/facilitated in the following treatment: Patient assisted with supine LE exercises, however limited d/t pain in bilateral knees. Wedge placed under R side d/t right lateral lean. Therapeutic Exercises:  ankle pumps, glut sets and hip abduction/adduction  x 5-10 reps. AAROM/PROM, limited d/t pain    At end of session, patient in bed with alarm call light and phone within reach,  all lines and tubes intact, nursing notified. Patient would benefit from continued skilled Physical Therapy to improve functional independence and quality of life.          Patient's/ family goals   get stronger    Time in  148  Time out 206    Total Treatment Time  18 minutes    CPT codes:  Therapeutic exercises (66831)   18 minutes  1 unit(s)    Tricia Juarez, [facility]  #[id number]

## 2021-11-22 NOTE — PROGRESS NOTES
Pharmacy Consultation Note  (Antibiotic Dosing and Monitoring)    Initial consult date: 11/22/2021  Consulting physician/provider: MAYRA Freire  Drug: Vancomycin  Indication: Bloodstream infection, SSTI    Age/  Gender Height Weight IBW  Allergy Information   70 y.o./female 5' 7.5\" (171.5 cm) 300 lb (136.1 kg)     Ideal body weight: 62.7 kg (138 lb 5.4 oz)  Adjusted ideal body weight: 95.2 kg (209 lb 12.8 oz)   Patient has no known allergies. Renal Function:  Recent Labs     11/20/21  1357 11/21/21  1340 11/22/21  0505   BUN 16 19 27*   CREATININE 0.8 0.9 1.5*       Intake/Output Summary (Last 24 hours) at 11/22/2021 1317  Last data filed at 11/22/2021 1051  Gross per 24 hour   Intake 1585 ml   Output 100 ml   Net 1485 ml     Vancomycin Monitoring:  Trough:  No results for input(s): VANCOTROUGH in the last 72 hours. Random:  No results for input(s): VANCORANDOM in the last 72 hours. Vancomycin Administration Times:  Recent vancomycin administrations      No vancomycin IV orders with administrations found. Assessment:  · Patient is a 79 y.o. female who has been initiated on vancomycin for SSTI (bilateral foot wounds) and bloodstream infection. · Blood cultures (11/20) growing GPC in clusters  · Urine culture (11/21) grew >100,000 CFU/mL Staph aureus (S pending)  · Estimated Creatinine Clearance: 52 mL/min (A) (based on SCr of 1.5 mg/dL (H)).  Baseline SCr ~ 0.7 mg/dL  · To dose vancomycin, pharmacy will be utilizing dosing based off of levels because of patient's renal impairment/insufficiency    Plan:  · Give vancomycin 2000 mg IV x 1 now  · Random level tomorrow with AM labs  · Will continue to monitor renal function     Thank you for this consult, please call with questions,  Tori Manley PharmD, BCPS 11/22/2021 1:17 PM   Ext: [phone]

## 2021-11-22 NOTE — PLAN OF CARE
Patient of the floor during rounds. Pertinent notes/labs reviewed. Slight decrease in H&H noted. Worsened renal failure with creatinine at 1.5  FOBT is pending. Consider inpatient upper endoscopy. Will see later today or tomorrow. Please, call with questions/concerns.   Thank you    Jaskaran Martinez MD

## 2021-11-23 PROBLEM — R57.9 SHOCK (HCC): Status: ACTIVE | Noted: 2021-01-01

## 2021-11-23 NOTE — PROGRESS NOTES
[facility]     Liaison Information Visit Note              Patient Name: Teagan Maldonado   Admit date:  11/19/2021   Hospital Admitting Physician:  Mode Lindsay MD   PCP:  No primary care provider on file. Primary Insurance: Payor: MEDICARE /  /  /    Emergency Contact:      Advance Directive:  NO  {DPOA-HC Name-Relation: Ceferino Gallegos sr   Phone: [phone]    Terminal Diagnosis hypoxic respiratory failure d/t pneumonia, severe sepsis as confirmed by Dr. Gosia Jose MD     Current Hospital Problem List:   Patient Active Problem List   Diagnosis Code    Elevated troponin R77.8    Diabetes ([address] E11.9    Open wound of right foot S91.301A    Open wound of left foot S91.302A    Acute kidney injury ([address] N17.9    Bacteremia R78.81    UTI (urinary tract infection) N39.0    Non-intractable vomiting R11.10    Generalized abdominal pain R10.84    Frequent falls R29.6    Open wnd of foot S91.309A    Closed fracture of second lumbar vertebra ([address] S32.029A    Primary hypertension I10    Shock ([address] R57.9    Cardiopulmonary arrest ([address] I46.9    Oliguria R34     Code Status Order: DNR-CC   Allergies:  Patient has no known allergies. Family Goal: comfort  Meeting held with Patient's , son DTR-in law, and DTr, awaiting additional son. The hospice benefit and philosophy were explained including that hospice is end of life care in which, per Medicare, a patient has a terminal diagnosis that life expectancy would be 6 months or less. Hospice care is a service that is covered by most insurance plans. The following levels of hospice care were discussed including, routine level of hospice care at private home or facility, which patient/family is responsible for any room and board fees at the facility, and general in patient level of care (GIP) at the [facility] for short term symptom management.   Per Medicare guidelines, a patient is considered appropriate for GIP if they have uncontrolled symptoms such as pain, agitation, labored breathing or nausea/vomiting. Once symptoms become managed, the patient would need to be moved to a lower level of care such as home with hospice, or ECF with hospice. Family informed that with the routine level of care at home or ECF,  the hospice team consists of the RN who visits 1-3 times a week, a  who visits within the first five days of the hospice election, the personal care team who visit 1-3 times a week, non-medical volunteers and Chaplains. Explained that at home in routine level of care, familles are responsible for the 24 hour care. Discussed that under hospice care patient would not receive chemotherapy, radiation, immune therapy, IV antibiotics, dialysis or blood transfusions. Explained that once in hospice care, all aggressive treatments would be stopped and allow nature to takes its course with focus on comfort care for the patient. Patient referral made, choiced to \[facility]". Family has been to the [facility] with other family members. Discharge Plan:  Discharge Disposition; ***  \[facility]\"" plan:  1.  ***  2. Please call \[facility]\"" [phone] with any questions. 3. Patient not currently under the care of hospice.     Electronically signed by Tor Benson RN on 11/23/2021 at 3:45 PM

## 2021-11-23 NOTE — H&P
Patient seen and examined. Pertinent notes/labs reviewed. Cardiology input appreciated with no plan for further work-up  Decreased H&H. H&P reviewed -no new changes except as described above. Vitals:    11/23/21 0745   BP: 125/60   Pulse: 70   Resp: 16   Temp: 97.7 °F (36.5 °C)   SpO2: 94%       Plan: Proceed with upper endoscopy  Above has been discussed with patient as well as with her  and all questions answered to their satisfaction. They are agreeable with the plan as delineated.       Merrill Bolivar MD

## 2021-11-23 NOTE — PROGRESS NOTES
Physical Therapy      Date: 11/23/2021 3:15 PM       PT on hold due to transfer to ICU from  and intubation; please reorder PT EVAL AND TREAT when patient able to participate. Thank you.   Electronically signed by Mohan Juan PTA on 11/23/2021 at 3:15 PM

## 2021-11-23 NOTE — PROGRESS NOTES
Podiatry Progress Note  11/23/2021   Raya Urbano     Planned procedure: Left foot I&D, debridement, hallux amputation to be performed Dr. Dev Law on 11/24/2021. N.p.o. midnight    SUBJECTIVE: Renata Patricia is a 79 y.o. female seen bedside for follow-up of bilateral full-thickness ulcers. X-ray reveals possible osteomyelitis left hallux distal phalanx. Was taken down for MRI left foot yesterday but experience excruciating pain and requested that it be postponed till today. Arterial studies are shows mild sclerotic disease to the left lower extremity. Vascular been consulted. Resting comfortably bed this a.m. with no new complaints.  at bedside. Scheduled procedure tomorrow as indicated above. They are agreeable. Denies any chest pain or shortness of breath, endorses some abdominal pain with nausea and vomiting that is improved today. Past Medical History:   Diagnosis Date    Back pain     Eczema         History reviewed. No pertinent surgical history. History reviewed. No pertinent family history. Social History     Tobacco Use    Smoking status: Never Smoker    Smokeless tobacco: Never Used   Substance Use Topics    Alcohol use: Never        Prior to Admission medications    Medication Sig Start Date End Date Taking? Authorizing Provider   ibuprofen (ADVIL;MOTRIN) 200 MG tablet Take 400 mg by mouth every 6 hours as needed for Pain    Yes Historical Provider, MD   Cinnamon 500 MG CAPS Take 2 capsules by mouth daily   Yes Historical Provider, MD        Patient has no known allergies. OBJECTIVE:        Vitals:    11/23/21 0745   BP: 125/60   Pulse: 70   Resp: 16   Temp: 97.7 °F (36.5 °C)   SpO2: 94%                  EXAM:        Pt is AAOx3    Vascular Exam:  DP and PT pulses nonpalpable bilaterally. CFT sluggish between 3 and 5 s to digits 2 through 5 left, one through 5 right.  Unable to assess CFT to left hallux as there is no open wound and hyperkeratotic changes to the skin. Skin temperature from proximal lower leg to distal digits bilaterally is warm to warm. +2 pitting edema bilateral pretibial, ankle, foot.     Neuro Exam: Diminished protective sensation bilaterally the digits and forefoot. Light touch reestablished to the proximal lower leg.     Dermatologic Exam:    Ulcer #1 left hallux: Full-thickness ulcer down to the level of exposed deep fascia plantarly and medially with large soft tissue deficit to the medial plantar left hallux, partially avulsed nail with lateral aspect of the left hallux nail intact. There is hyperpigmented and hyper keratotic changes to   the left hallux consistent with chronic edema and advanced lichenification. The wound does not probe to bone, there is no exposed muscle or tendon, wound bed has some light serous drainage with no malodor. No undermining, and, no crepitus or fluctuance. There is no proximal streaking.      Ulcer #2, #3 right second and first digits dorsally: Unstageable wounds to the dorsal aspect of the right first and second digits, stable at this time with no active drainage or crepitus or fluctuance. There is no proximal streaking to these wounds, there is no malodor, there is no clinical signs of infection. There are chronic venous lower extremity edema skin changes to the anterior lower leg with thickening and papillomatous changes.     MSK: Muscle strength 5/5 Bilateral . No tenderness on palpation. There is pain with range of motion of the leg secondary to back pain and hip pain.     Current Facility-Administered Medications   Medication Dose Route Frequency Provider Last Rate Last Admin    perflutren lipid microspheres (DEFINITY) injection 1.65 mg  1.5 mL IntraVENous ONCE PRN Severo Friends, APRN - CNP        vancomycin Krissy Flow) intermittent dosing (placeholder)   Other RX Placeholder Severo Friends, APRN - CNP        carvedilol (COREG) tablet 12.5 mg  12.5 mg Oral BID  Hilario Laura MD   12.5 mg at 11/23/21 0857    atorvastatin (LIPITOR) tablet 40 mg  40 mg Oral Nightly Hilario Shaquille Laura MD   40 mg at 11/22/21 2132    pantoprazole (PROTONIX) injection 40 mg  40 mg IntraVENous BID Severa List, MD   40 mg at 11/23/21 0857    And    sodium chloride (PF) 0.9 % injection 10 mL  10 mL IntraVENous BID Severa List, MD   10 mL at 11/23/21 0858    sodium chloride flush 0.9 % injection 5-40 mL  5-40 mL IntraVENous 2 times per day Odell Florian MD   10 mL at 11/23/21 0858    sodium chloride flush 0.9 % injection 5-40 mL  5-40 mL IntraVENous PRN Odell Florian MD   10 mL at 11/22/21 1051    0.9 % sodium chloride infusion  25 mL IntraVENous PRN Odell Florian MD        enoxaparin (LOVENOX) injection 40 mg  40 mg SubCUTAneous Daily Odell Florian MD   40 mg at 11/22/21 0845    ondansetron (ZOFRAN-ODT) disintegrating tablet 4 mg  4 mg Oral Q8H PRN Odell Florian MD        Or    [facility] injection 4 mg  4 mg IntraVENous Q6H PRN Odell Florian MD   4 mg at 11/20/21 1751    polyethylene glycol (GLYCOLAX) packet 17 g  17 g Oral Daily PRN Odell Florian MD        acetaminophen (TYLENOL) tablet 650 mg  650 mg Oral Q6H PRN Odell Florian MD   650 mg at 11/20/21 2038    Or    acetaminophen (TYLENOL) suppository 650 mg  650 mg Rectal Q6H PRN Odell Florian MD        insulin lispro (HUMALOG) injection vial 0-12 Units  0-12 Units SubCUTAneous TID WC Odell Florian MD   4 Units at 11/22/21 1700    insulin lispro (HUMALOG) injection vial 0-6 Units  0-6 Units SubCUTAneous Nightly Odell Florian MD   1 Units at 11/22/21 2144    glucose (GLUTOSE) 40 % oral gel 15 g  15 g Oral PRN Odell Florian MD        dextrose 50 % IV solution  12.5 g IntraVENous PRN Odell Florian MD        glucagon (rDNA) injection 1 mg  1 mg IntraMUSCular PRN Odell Florian MD        dextrose 5 % solution  100 mL/hr IntraVENous PRN Odell Florian MD  0.9 % sodium chloride infusion   IntraVENous Continuous Meggan MD Julien 125 mL/hr at 11/22/21 1407 New Bag at 11/22/21 1407    HYDROcodone-acetaminophen (NORCO) 5-325 MG per tablet 1 tablet  1 tablet Oral Q6H PRN Miguel Ayala MD   1 tablet at 11/23/21 0890        Lab Results   Component Value Date    WBC 14.6 (H) 11/23/2021    HCT 28.1 (L) 11/23/2021    HGB 8.9 (L) 11/23/2021     11/23/2021     (L) 11/23/2021    K 4.6 11/23/2021    CL 98 11/23/2021    CO2 19 (L) 11/23/2021    BUN 42 (H) 11/23/2021    CREATININE 2.2 (H) 11/23/2021    GLUCOSE 167 (H) 11/23/2021         ASSESSMENT:  -Full-thickness ulcer down to level of exposed deep fascia, POA, not infected  -Type 2 diabetes mellitus with peripheral neuropathy  -Uncontrolled untreated diabetes with possible DKA     PLAN:  - Examined and evaluated  - All labs, imaging, and charts reviewed   - WBC 14.4  -X-rays bilateral feet 11/20/2021 reveals no acute findings right lower extremity, acute versus chronic osteomyelitis left hallux distal phalanx with no subQ gas, flattened plantar arch with subluxation of talus in relation to the navicular. -MRI left foot ordered and pending 11/21/2021  -Arterial studies 11/20/2021 reveal MECCA 0.8 left, 1.3 right, stenotic vascular disease left lower extremity. Vascular consulted  -Wound culture 11/22/2021 of left hallux pending  -Dressings consisted of Xeroform DSD bilaterally. Daily dressing changes  - Planned procedure: Left foot I&D, debridement, hallux amputation to be performed Dr. Jim Anglin on 11/24/2021.  - N.p.o. midnight    -We will continue to follow    D/W:   Silverio Ritchie DPM [id number]  Fellowship-Trained Foot and Ankle Surgeon  Diplomate, American Board of Foot and Ankle Surgeons  [phone]       Thank you for involving podiatry in this patients care. Please do not hesitate to call with any questions or concerns.      Eliseo Sorenson Podiatry PGY2  11/23/2021   11:08 AM

## 2021-11-23 NOTE — CONSULTS
REASON:     RIGHT AND LEFT FOOT OPEN WOUNDS  PAD  ABNORMAL PVR / ABIs        ASSESSMENT / PLAN OF CARE    1. PAD with non healing wounds both feet. Abnormal ABIs / PVR left worse than right. A CTA of the Abdominal Aorta with run-off may help determine if she can benefit from a revascularization to hepl on wound healing.

## 2021-11-23 NOTE — PROGRESS NOTES
Perfect serve sent to Dr. Ashwin Abel to update that patient had 100 ml out in [place] over last 8 hours, with a bladder scan of 125 ml. Per Dr. Ashwin Abel he will notify Dr. Soraya Uribe.  Electronically signed by Mariela Hamilton RN on 11/23/2021 at 8:04 AM

## 2021-11-23 NOTE — SIGNIFICANT EVENT
[address] [place], [place] [address]ist RRT/Code Blue Note    Subjective:    Called to bedside for CODE BLUE. Patient was at echo, became lethargic so RRT was called. While team was en route, patient became unresponsive and monitor showed bradycardia to asystole. CPR and bag mask ventilation was immediately started. Patient was intubated by ED physician; bilateral breath sounds and positive color change on capnography. After 12 minutes of CPR, 3 doses of epinephrine, 1L bolus IVF and 2 amps of bicarbonate, ROSC was achieved. Blood pressure was in the 50s-60s with doppler, so norepinephrine drip was started. IVF with normal saline with pressure bag continued. A left subclavian central venous catheter was placed by ED physician.  vancomycin (VANCOCIN) intermittent dosing (placeholder)   Other RX Placeholder    carvedilol  12.5 mg Oral BID WC    atorvastatin  40 mg Oral Nightly    pantoprazole  40 mg IntraVENous BID    And    sodium chloride (PF)  10 mL IntraVENous BID    sodium chloride flush  5-40 mL IntraVENous 2 times per day    enoxaparin  40 mg SubCUTAneous Daily    insulin lispro  0-12 Units SubCUTAneous TID WC    insulin lispro  0-6 Units SubCUTAneous Nightly     perflutren lipid microspheres, 1.5 mL, ONCE PRN  sodium chloride flush, 5-40 mL, PRN  sodium chloride, 25 mL, PRN  ondansetron, 4 mg, Q8H PRN   Or  ondansetron, 4 mg, Q6H PRN  polyethylene glycol, 17 g, Daily PRN  acetaminophen, 650 mg, Q6H PRN   Or  acetaminophen, 650 mg, Q6H PRN  glucose, 15 g, PRN  dextrose, 12.5 g, PRN  glucagon (rDNA), 1 mg, PRN  dextrose, 100 mL/hr, PRN  HYDROcodone 5 mg - acetaminophen, 1 tablet, Q6H PRN         Objective:    BP (!) 140/67   Pulse 67   Temp 97.5 °F (36.4 °C)   Resp 20   Ht 5' 7.5\" (1.715 m)   Wt (!) 317 lb (143.8 kg)   SpO2 96%   BMI 48.92 kg/m²   General Appearance: intubated, mechanically ventilated.  Does not respond to stimuli  Skin: warm and dry, no rash or erythema  Head: normocephalic and atraumatic  Eyes: Pupils initially fixed and dilated, now 3mm bilaterally and sluggish  ENT: ET tube to 23cm at the lip  Neck: supple and non-tender without mass, no thyromegaly or thyroid nodules, no cervical lymphadenopathy  Pulmonary/Chest: Intubated, mechanically ventilated. Diminished but equal breath sounds bilaterally  Cardiovascular: RRR, S1, S2 with 1/6 systolic murmur  Abdomen: obese, soft, non-tender, non-distended, normal bowel sounds, no masses or organomegaly  Extremities: Mild bilateral LE edema. No cyanosis or clubbing   Musculoskeletal: Normal muscle mass. No joint swelling or deformity  Neurologic: Unresponsive to stimuli. Reflexes diminished      Recent Labs     11/21/21  1340 11/22/21  0505 11/23/21  0541    132 129*   K 4.5 4.0 4.6   CL 99 98 98   CO2 24 22 19*   BUN 19 27* 42*   CREATININE 0.9 1.5* 2.2*   GLUCOSE 203* 69* 167*   CALCIUM 7.7* 7.6* 7.7*       Recent Labs     11/21/21  1340 11/22/21  0505 11/23/21  0541   WBC 13.0* 14.4* 14.6*   RBC 3.50 3.14* 3.18*   HGB 9.8* 9.0* 8.9*   HCT 31.2* 28.1* 28.1*   MCV 89.1 89.5 88.4   MCH 28.0 28.7 28.0   MCHC 31.4* 32.0 31.7*   RDW 13.8 13.9 14.1    143 143   MPV 10.7 11.0 10.9           I/O last 3 completed shifts: In: 200 [P.O.:420; I.V.:10]  Out: 100 [Urine:100]  No intake/output data recorded. Assessment/Plan:    Principal Problem:    Elevated troponin  Active Problems:    Diabetes (HCC)    Open wound of right foot    Open wound of left foot    Acute kidney injury ([facility]    Bacteremia    UTI (urinary tract infection)    Non-intractable vomiting    Generalized abdominal pain    Frequent falls    Open wnd of foot    Closed fracture of second lumbar vertebra ([facility]    Primary hypertension    Shock ([facility]  Resolved Problems:    * No resolved hospital problems. *      1.  Cardiopulmonary arrest  -status post ROSC after about 12 minutes of ACLS protocol  -transfer to ICU  -will discuss with pulmonary/critical care the possibility of therapeutic hypothermia protocol   -obtain stat ABG, CBC, CMP, magnesium, phosphorus, lactic acid, troponin, EKG  -stat CXR     2. Shock  -possibly septic vs hypovolemic   -continue pressors and IV fluids    Discussed with primary physician Dr. Keyana Rowland  Discussed with pulmonary/critical care Dr. Delano Gaytan care time 35 minutes not including procedures. NOTE: This report was transcribed using voice recognition software.  Every effort was made to ensure accuracy; however, inadvertent computerized transcription errors may be present.     Electronically signed by Kedar Pulliam DO on 11/23/2021 at 12:32 PM

## 2021-11-23 NOTE — PLAN OF CARE
Spoke with patient's son and  regarding goals of care after arrest and intubation earlier today. Son and  had a discussion with family and decided to make patient DNR-CC. The family is all in agreement that this would be the patient's wishes. They would also like hospice consulted and stated they did not have any specific hospice company in mind but were ok with [facility]. All questions and concerns were addressed during this time.

## 2021-11-23 NOTE — ANESTHESIA PRE PROCEDURE
Department of Anesthesiology  Preprocedure Note       Name:  Elizabet Anguiano   Age:  79 y.o.  :  1950                                          MRN:  [id number]         Date:  2021      Surgeon: Yenni Pérez):  Drew Redding MD    Procedure: Procedure(s):  EGD ESOPHAGOGASTRODUODENOSCOPY    Medications prior to admission:   Prior to Admission medications    Medication Sig Start Date End Date Taking?  Authorizing Provider   ibuprofen (ADVIL;MOTRIN) 200 MG tablet Take 400 mg by mouth every 6 hours as needed for Pain    Yes Historical Provider, MD   Cinnamon 500 MG CAPS Take 2 capsules by mouth daily   Yes Historical Provider, MD       Current medications:    Current Facility-Administered Medications   Medication Dose Route Frequency Provider Last Rate Last Admin    norepinephrine (LEVOPHED) 16 mg in dextrose 5 % 250 mL infusion  2-100 mcg/min IntraVENous Continuous Shay Ha DO 23.4 mL/hr at 21 1213 25 mcg/min at 21 1213    perflutren lipid microspheres (DEFINITY) injection 1.65 mg  1.5 mL IntraVENous ONCE PRN NICK Coronado CNP        vancomycin (VANCOCIN) intermittent dosing (placeholder)   Other RX Placeholder NICK Coronado - BERNY        carvedilol (COREG) tablet 12.5 mg  12.5 mg Oral BID WC Hilario Laura MD   12.5 mg at 21 0857    atorvastatin (LIPITOR) tablet 40 mg  40 mg Oral Nightly Hilario Laura MD   40 mg at 21 2132    pantoprazole (PROTONIX) injection 40 mg  40 mg IntraVENous BID Russell Rinaldi MD   40 mg at 21 0857    And    sodium chloride (PF) 0.9 % injection 10 mL  10 mL IntraVENous BID Russell Rinaldi MD   10 mL at 21 0858    sodium chloride flush 0.9 % injection 5-40 mL  5-40 mL IntraVENous 2 times per day Jt Cardona MD   10 mL at 21 0858    sodium chloride flush 0.9 % injection 5-40 mL  5-40 mL IntraVENous PRN Jt Cardona MD   10 mL at 21 1051    0.9 % sodium chloride infusion  25 mL IntraVENous PRN Nancy Conn MD        enoxaparin (LOVENOX) injection 40 mg  40 mg SubCUTAneous Daily Nancy Conn MD   40 mg at 11/22/21 0845    ondansetron (ZOFRAN-ODT) disintegrating tablet 4 mg  4 mg Oral Q8H PRN Nancy Conn MD        Or    ondansetron [facility]) injection 4 mg  4 mg IntraVENous Q6H PRN Nancy Conn MD   4 mg at 11/20/21 1751    polyethylene glycol (GLYCOLAX) packet 17 g  17 g Oral Daily PRN Nancy Conn MD        acetaminophen (TYLENOL) tablet 650 mg  650 mg Oral Q6H PRN Nancy Conn MD   650 mg at 11/20/21 2038    Or    acetaminophen (TYLENOL) suppository 650 mg  650 mg Rectal Q6H PRN Nancy Conn MD        insulin lispro (HUMALOG) injection vial 0-12 Units  0-12 Units SubCUTAneous TID WC Nancy Conn MD   4 Units at 11/22/21 1700    insulin lispro (HUMALOG) injection vial 0-6 Units  0-6 Units SubCUTAneous Nightly Nancy Conn MD   1 Units at 11/22/21 2144    glucose (GLUTOSE) 40 % oral gel 15 g  15 g Oral PRN Nancy Conn MD        dextrose 50 % IV solution  12.5 g IntraVENous PRN Nancy Conn MD        glucagon (rDNA) injection 1 mg  1 mg IntraMUSCular PRN Nancy Conn MD        dextrose 5 % solution  100 mL/hr IntraVENous PRN Nancy Conn MD        0.9 % sodium chloride infusion   IntraVENous Continuous Hong Cade  mL/hr at 11/22/21 1407 New Bag at 11/22/21 1407    HYDROcodone-acetaminophen ([address]) 5-325 MG per tablet 1 tablet  1 tablet Oral Q6H PRN Brennan Betts MD   1 tablet at 11/23/21 0857       Allergies:  No Known Allergies    Problem List:    Patient Active Problem List   Diagnosis Code    Elevated troponin R77.8    Diabetes ([facility] [address] E11.9    Open wound of right foot S91.301A    Open wound of left foot S91.302A    Acute kidney injury ([facility] [address] N17.9    Bacteremia R78.81    UTI (urinary tract infection) N39.0    Non-intractable vomiting R11.10    Generalized abdominal pain R10.84    Frequent falls R29.6    Open wnd of foot S91.309A    Closed fracture of second lumbar vertebra ([address] S32.029A    Primary hypertension I10       Past Medical History:        Diagnosis Date    Back pain     Eczema        Past Surgical History:  History reviewed. No pertinent surgical history. Social History:    Social History     Tobacco Use    Smoking status: Never Smoker    Smokeless tobacco: Never Used   Substance Use Topics    Alcohol use: Never                                Counseling given: No      Vital Signs (Current):   Vitals:    11/23/21 0745 11/23/21 1200 11/23/21 1214 11/23/21 1217   BP: 125/60 132/70 132/65 136/67   Pulse: 70 69 70 70   Resp: 16 20 20    Temp: 97.7 °F (36.5 °C)      TempSrc: Oral      SpO2: 94%  92% 94%   Weight:       Height:                                                  BP Readings from Last 3 Encounters:   11/23/21 136/67   03/04/20 (!) 175/89       NPO Status:                                                                                 BMI:   Wt Readings from Last 3 Encounters:   11/22/21 (!) 317 lb (143.8 kg)   03/04/20 280 lb (127 kg)     Body mass index is 48.92 kg/m². CBC:   Lab Results   Component Value Date    WBC 14.6 11/23/2021    RBC 3.18 11/23/2021    HGB 8.9 11/23/2021    HCT 28.1 11/23/2021    MCV 88.4 11/23/2021    RDW 14.1 11/23/2021     11/23/2021       CMP:   Lab Results   Component Value Date     11/23/2021    K 4.6 11/23/2021    CL 98 11/23/2021    CO2 19 11/23/2021    BUN 42 11/23/2021    CREATININE 2.2 11/23/2021    GFRAA 27 11/23/2021    LABGLOM 22 11/23/2021    GLUCOSE 167 11/23/2021    GLUCOSE 134 11/05/2011    PROT 6.8 11/19/2021    CALCIUM 7.7 11/23/2021    BILITOT 0.8 11/19/2021    ALKPHOS 86 11/19/2021    AST 31 11/19/2021    ALT 26 11/19/2021       POC Tests: No results for input(s): POCGLU, POCNA, POCK, POCCL, POCBUN, POCHEMO, POCHCT in the last 72 hours.     Coags:   Lab Results   Component Value Date    PROTIME 14.9 11/23/2021    INR 1.3 11/23/2021       HCG (If Applicable): No results found for: PREGTESTUR, PREGSERUM, HCG, HCGQUANT     ABGs: No results found for: PHART, PO2ART, WBD4KUS, HTG7AOT, BEART, K6NRNMSF     Type & Screen (If Applicable):  No results found for: LABABO, LABRH    Drug/Infectious Status (If Applicable):  No results found for: HIV, HEPCAB    COVID-19 Screening (If Applicable):   Lab Results   Component Value Date    COVID19 Not Detected 11/20/2021       Impression   CHEST:       No acute traumatic abnormality is evident in the chest.       Left thyroid nodule.  See recommendation below.       Enlarged main pulmonary artery, which can be seen in the setting of pulmonary   arterial hypertension.       Mild cardiomegaly.       Findings suggestive of pulmonary vascular congestion.       Findings suggestive of ankylosing spondylitis.       ABDOMEN/PELVIS:       Fracture of L2 vertebral body involving the anterior column.  No significant   height loss or retropulsion.       No evidence of solid or hollow organ injury.       Nonspecific skin thickening in the lower anterior abdominal wall which may   represent cellulitis or simple body wall edema.  No evidence of abscess.       Small hiatal hernia.       Atherosclerotic disease.       Status post cholecystectomy.       RECOMMENDATIONS:   2.6 cm incidental left thyroid nodule. Recommend thyroid US. Reference: J Am Rony Radiol. 2015 Feb;12(2): 143-50       Impression   1. Oblique fracture of the anterior portion of the L2 vertebral body with   mild compression as described above.  This could be acute or subacute.  The   patient should return for completion of the examination when clinically   feasible. 2. Degenerative change.  Changes of ankylosing spondylitis in the lower   thoracic spine.  Possible mild central canal stenosis at L1-2.          Anesthesia Evaluation  Patient summary reviewed and Nursing notes reviewed no history of anesthetic complications:   Airway:         Dental:          Pulmonary:Negative Pulmonary ROS       (-) not a current smoker                           Cardiovascular:  Exercise tolerance: poor (<4 METS),   (+) hypertension: moderate,       ECG reviewed          Cleared by cardiology     Beta Blocker:  Not on Beta Blocker      ROS comment: Sinus tachycardia  Nonspecific ST abnormality  Delayed R wave transition  No previous ECGs available    Per cardiology:  Elevated troponin: Secondary to comorbid conditions, doubt ACS type I - no further testing recommended     Neuro/Psych:   Negative Neuro/Psych ROS              GI/Hepatic/Renal:   (+) hiatal hernia, GERD:, renal disease (42/2.2 w/ a GFR of 22): ARF, morbid obesity (BMI 48.9 kg/m2)          Endo/Other:    (+) Diabetes (A1C 7.9%)Type II DM, , blood dyscrasia (8.9/29.1): anemia:., electrolyte abnormalities (Hyponatremia (129 mmol/L)), . Pt had no PAT visit        ROS comment: Compression fracture L2 and chronic back pain    Eczema Abdominal:             Vascular: negative vascular ROS.  + PVD, aortic or cerebral, . Other Findings:             Anesthesia Plan      MAC     ASA 4       Induction: intravenous. Anesthetic plan and risks discussed with patient. Plan discussed with CRNA. Gabriel Willard DO   11/23/2021      History, data, and pertinent studies from chart review. Above represents information available via the shared medical record including previous anesthetic, medication, and allergy history.   Confirmation of above and final disposition per DOS anesthesiologist.    Gabriel Willard DO  Staff Anesthesiologist  November 23, 2021  12:27 PM

## 2021-11-23 NOTE — PROGRESS NOTES
Patient is seen in follow-up for elevated troponin    Subjective:     Ms. Katie Edwards patient had cardiac arrest earlier today while she was getting ready to have her echocardiogram, CPR and ACLS protocol was started, with return of spontaneous circulation, patient is now intubated, hemodynamically stable    ROS:  Unable to obtain since patient is intubated    Medication side effects: none    Scheduled Meds:   vancomycin (VANCOCIN) intermittent dosing (placeholder)   Other RX Placeholder    [Held by provider] carvedilol  12.5 mg Oral BID WC    atorvastatin  40 mg Oral Nightly    pantoprazole  40 mg IntraVENous BID    And    sodium chloride (PF)  10 mL IntraVENous BID    sodium chloride flush  5-40 mL IntraVENous 2 times per day    enoxaparin  40 mg SubCUTAneous Daily    insulin lispro  0-12 Units SubCUTAneous TID WC    insulin lispro  0-6 Units SubCUTAneous Nightly     Continuous Infusions:   norepinephrine 25 mcg/min (11/23/21 1213)    sodium chloride      dextrose      sodium chloride 50 mL/hr at 11/23/21 1355     PRN Meds:perflutren lipid microspheres, sodium chloride flush, sodium chloride, ondansetron **OR** ondansetron, polyethylene glycol, acetaminophen **OR** acetaminophen, glucose, dextrose, glucagon (rDNA), dextrose, HYDROcodone 5 mg - acetaminophen    I/O last 3 completed shifts: In: 200 [P.O.:420; I.V.:10]  Out: 100 [Urine:100]  No intake/output data recorded.       Objective:      Physical Exam:   /63   Pulse 74   Temp 97.5 °F (36.4 °C)   Resp 20   Ht 5' 7.5\" (1.715 m)   Wt (!) 317 lb (143.8 kg)   SpO2 95%   BMI 48.92 kg/m²   CONSTITUTIONAL: No apparent distress, and appears stated age  HEAD:  normocepalic, without obvious abnormality, atraumatic  NECK:  Supple, symmetrical, trachea midline, no adenopathy, thyroid symmetric, not enlarged and no tenderness, skin normal  LUNGS:  No increased work of breathing, No accessory muscle use or intercostal retractions, good air exchange, scattered wheezing rhonchi  CARDIOVASCULAR:  Normal apical impulse, regular rate and rhythm, normal S1 and S2, no S3 or S4, 2 out of 6 systolic murmur at the apex, 2 out of 6 systolic murmur at the left lower sternal border, + edema, no JVD, no carotid bruit. ABDOMEN:  Soft, nontender, no masses, no hepatomegaly, no splenomegaly, BS+  MUSCULOSKELETAL:  No clubbing no cyanosis. there is no redness, warmth, or swelling of the joints  NEUROLOGIC:  Alert, awake,oriented x3  SKIN:  no bruising or bleeding, normal skin color, texture, turgor and no redness, warmth, or swelling      Cardiographics  I personally reviewed the telemetry monitor strips with the following interpretation: Sinus rhythm    Echocardiogram: Pending    Imaging  XR CHEST PORTABLE   Final Result   Addendum 1 of 1   ADDENDUM:   Elevation right hemidiaphragm with low lung volumes and possible right    lower   lobe infiltrate. . There is no effusion or pneumothorax. The    cardiomediastinal   silhouette is without acute process. The osseous structures are without    acute   process. Final   Elevation right hemidiaphragm low lung volumes and possible right lower lobe   infiltrate. Follow-up recommended. VL LOWER EXTREMITY ARTERIAL SEGMENTAL PRESSURES W PPG   Final Result   Findings consistent with mild arterial stenosis in the left lower extremity. PVR waveforms are suggestive of small caliber arterial disease in the   bilateral feet. XR FOOT LEFT (MIN 3 VIEWS)   Final Result   1. New amputation of distal aspect of distal phalanx of 1st digit suggesting   acute or chronic osteomyelitis. 2. New flattening of plantar arch and inferior subluxation of talus in   relation to navicular. 3. No subcutaneous gas. XR FOOT RIGHT (2 VIEWS)   Final Result   No evidence of osteomyelitis or subcutaneous gas associated with the right   foot. MRI LUMBAR SPINE WO CONTRAST   Final Result   1.  Oblique fracture of the anterior portion of the L2 vertebral body with   mild compression as described above. This could be acute or subacute. The   patient should return for completion of the examination when clinically   feasible. 2. Degenerative change. Changes of ankylosing spondylitis in the lower   thoracic spine. Possible mild central canal stenosis at L1-2.         US ABDOMEN LIMITED Specify organ? LIVER, PANCREAS, SPLEEN, APPENDIX   Final Result   Unremarkable right upper quadrant ultrasound. CT Head WO Contrast   Final Result   No acute intracranial abnormality. Small right posterior parietal/occipital scalp contusion. CT Cervical Spine WO Contrast   Final Result   No traumatic injuries in cervical spine. Focal 2 cm left thyroid nodule. Follow-up evaluation with outpatient thyroid   ultrasound may be obtained if for further evaluation. Ground-glass opacity in right lung apex. Follow-up separate CT chest report   for further details. CT ABDOMEN PELVIS W IV CONTRAST Additional Contrast? None   Final Result   CHEST:      No acute traumatic abnormality is evident in the chest.      Left thyroid nodule. See recommendation below. Enlarged main pulmonary artery, which can be seen in the setting of pulmonary   arterial hypertension. Mild cardiomegaly. Findings suggestive of pulmonary vascular congestion. Findings suggestive of ankylosing spondylitis. ABDOMEN/PELVIS:      Fracture of L2 vertebral body involving the anterior column. No significant   height loss or retropulsion. No evidence of solid or hollow organ injury. Nonspecific skin thickening in the lower anterior abdominal wall which may   represent cellulitis or simple body wall edema. No evidence of abscess. Small hiatal hernia. Atherosclerotic disease. Status post cholecystectomy. RECOMMENDATIONS:   2.6 cm incidental left thyroid nodule. Recommend thyroid US.    Reference: J Am Rony Radiol. 2015 Feb;12(2): 143-50         CT CHEST WO CONTRAST   Final Result   CHEST:      No acute traumatic abnormality is evident in the chest.      Left thyroid nodule. See recommendation below. Enlarged main pulmonary artery, which can be seen in the setting of pulmonary   arterial hypertension. Mild cardiomegaly. Findings suggestive of pulmonary vascular congestion. Findings suggestive of ankylosing spondylitis. ABDOMEN/PELVIS:      Fracture of L2 vertebral body involving the anterior column. No significant   height loss or retropulsion. No evidence of solid or hollow organ injury. Nonspecific skin thickening in the lower anterior abdominal wall which may   represent cellulitis or simple body wall edema. No evidence of abscess. Small hiatal hernia. Atherosclerotic disease. Status post cholecystectomy. RECOMMENDATIONS:   2.6 cm incidental left thyroid nodule. Recommend thyroid US. Reference: J Am Rony Radiol. 2015 Feb;12(2): 143-50         MRI FOOT LEFT WO CONTRAST    (Results Pending)   XR CHEST PORTABLE    (Results Pending)   XR ABDOMEN FOR NG/OG/NE TUBE PLACEMENT    (Results Pending)       Lab Review   Lab Results   Component Value Date     11/23/2021    K 4.6 11/23/2021    CL 98 11/23/2021    CO2 19 11/23/2021    BUN 42 11/23/2021    CREATININE 2.2 11/23/2021    GLUCOSE 167 11/23/2021    GLUCOSE 134 11/05/2011    CALCIUM 7.7 11/23/2021     Lab Results   Component Value Date    WBC 14.6 11/23/2021    HGB 8.9 11/23/2021    HCT 28.1 11/23/2021    MCV 88.4 11/23/2021     11/23/2021     I have personally reviewed the laboratory, cardiac diagnostic and radiographic testing as outlined above:    Assessment:     1. Cardiac arrest: Etiology?,  S/p CPR with our return of spontaneous circulation, will review echocardiogram  2. History of elevated troponin: Secondary to comorbid conditions, doubt ACS type I  3.   Compression fracture of

## 2021-11-23 NOTE — PROGRESS NOTES
[facility]  Nephrology Attending Progress Note  Elle Thoams. Wendy Rosales MD        SUBJECTIVE:   11/22: Pt is a 78 yo female with a pmh of dm, htn, who presented after falling several times and having abd pain, nausea and vomiting. She did not hit her head. She does not follow with an md and is not on any meds. Her lab show elevated tp, cr 0.7>1.5, na 132, k 4. hgb 9, wbc 14.4, plt 143. She received at ct abd pelvis on 11/19/21 with contrast. There was no hydro. She is on ns at 125 ml/hr. She was started on coreg, lipitor as well.      11/23: pt seen in room, uo low , on ivf    PROBLEM LIST:    Patient Active Problem List   Diagnosis    Elevated troponin    Diabetes ([address]    Open wound of right foot    Open wound of left foot    Acute kidney injury ([address]    Bacteremia    UTI (urinary tract infection)    Non-intractable vomiting    Generalized abdominal pain    Frequent falls    Open wnd of foot    Closed fracture of second lumbar vertebra ([address]    Primary hypertension        PAST MEDICAL HISTORY:    Past Medical History:   Diagnosis Date    Back pain     Eczema        DIET:    Diet NPO Exceptions are: Sips of Water with Meds  Diet NPO Exceptions are: Sips of Water with Meds     PHYSICAL EXAM:     Patient Vitals for the past 24 hrs:   BP Temp Temp src Pulse Resp SpO2   11/23/21 0745 125/60 97.7 °F (36.5 °C) Oral 70 16 94 %   11/22/21 2300 -- -- -- 65 -- --   11/22/21 1600 (!) 112/58 98.4 °F (36.9 °C) Oral 67 18 97 %   @      Intake/Output Summary (Last 24 hours) at 11/23/2021 1057  Last data filed at 11/23/2021 1049  Gross per 24 hour   Intake 240 ml   Output 100 ml   Net 140 ml         Wt Readings from Last 3 Encounters:   11/22/21 (!) 317 lb (143.8 kg)   03/04/20 280 lb (127 kg)       Constitutional:  Pt is in no acute distress  Head: normocephalic, atraumatic  Neck: no JVD  Cardiovascular: regular rate and rhythm, no murmurs, gallops, or rubs  Respiratory:  No rales, rhochi, or wheezes  Gastrointestinal:  Soft, nontender, nondistended, bowel sounds x 4  Ext: no edema  Skin: dry, no rash  Neuro: aaox3    MEDS (scheduled):    vancomycin (VANCOCIN) intermittent dosing (placeholder)   Other RX Placeholder    carvedilol  12.5 mg Oral BID WC    atorvastatin  40 mg Oral Nightly    pantoprazole  40 mg IntraVENous BID    And    sodium chloride (PF)  10 mL IntraVENous BID    sodium chloride flush  5-40 mL IntraVENous 2 times per day    enoxaparin  40 mg SubCUTAneous Daily    insulin lispro  0-12 Units SubCUTAneous TID WC    insulin lispro  0-6 Units SubCUTAneous Nightly       MEDS (infusions):   sodium chloride      dextrose      sodium chloride 125 mL/hr at 11/22/21 1407       MEDS (prn):  perflutren lipid microspheres, sodium chloride flush, sodium chloride, ondansetron **OR** ondansetron, polyethylene glycol, acetaminophen **OR** acetaminophen, glucose, dextrose, glucagon (rDNA), dextrose, HYDROcodone 5 mg - acetaminophen    DATA:    Recent Labs     11/21/21  1340 11/22/21  0505 11/23/21  0541   WBC 13.0* 14.4* 14.6*   HGB 9.8* 9.0* 8.9*   HCT 31.2* 28.1* 28.1*   MCV 89.1 89.5 88.4    143 143     Recent Labs     11/21/21  1340 11/22/21  0505 11/23/21  0541    132 129*   K 4.5 4.0 4.6   CL 99 98 98   CO2 24 22 19*   BUN 19 27* 42*   CREATININE 0.9 1.5* 2.2*   LABGLOM >60 34 22   GLUCOSE 203* 69* 167*   CALCIUM 7.7* 7.6* 7.7*       Lab Results   Component Value Date    LABALBU 3.4 (L) 11/19/2021    LABALBU 4.3 11/05/2011    LABALBU 4.2 06/06/2011     Lab Results   Component Value Date    TSH 0.906 11/21/2021       Iron Studies  No results found for: IRON, TIBC, FERRITIN  No results found for: [id number]  No results found for: FOLATE    No results found for: VITD25  No results found for: PTH    No components found for: URIC    Lab Results   Component Value Date    COLORU Yellow 11/19/2021    NITRU Negative 11/19/2021    GLUCOSEU 500 11/19/2021    KETUA >=80 11/19/2021 UROBILINOGEN 1.0 11/19/2021    BILIRUBINUR Negative 11/19/2021       No results found for: LIPIDPAN      IMPRESSION/RECOMMENDATIONS:      1. arf  Cr 0.7 on admission >1.5>2.2  Sp dye 11/19/21  Continue ivf, lower to 50  No hyrdro on imaging  fena < 1  Daily bmp  Dye induced atn     2. htn  Follow on coreg     3. Elevated tp     4. Anemia  Gi following     5. armani le wounds  Podiatry seeing    Stacey Rosario.  Daryn Zavala MD

## 2021-11-23 NOTE — PROCEDURES
11/23/21  1130h      Responded to rapid response on first floor. Arrived patient pulseless CPR in progress. Per available history patient admitted was for inpatient echocardiogram, full code. Resuscitation in progress. Emergent intubation,  ROSC achieved. TLC inserted left SC 2/2 body habitus. Code team at bedside, plan is for transfer to the ICU now and continued resuscitation. PROCEDURE  11/23/21       Time: 1130h    INTUBATION  Risks, benefits and alternatives if able (for applicable procedures below) described. Performed By: Robbie Pace DO. Indication:  comatose state. Informed consent: Consent unable to be obtained due to patient's condition. .  Procedure: Following Preoxygenation the patient was pretreated with None followed by None. Intubation was performed after single attempt(s) by direct laryngoscopy using a Glidescope and 7.5mm cuffed endotracheal tube was inserted . Initial post procedure placement:  confirmed by bilateral breath sounds, ETCO2 detection, and absence of sounds over stomach. Tube Secured @ 23cm at the Lip. Post procedure chest x-ray: has been ordered but is still pending. Procedural Complications: None. Anesthesia Consult:  No.       PROCEDURE  11/23/21       Time: 1130H    CENTRAL LINE INSERTION  Risks, benefits and alternatives (for applicable procedures below) described. Performed By: Robbie Pace DO. Indication: centrally administered medications and cardiac arrest/Shock . Informed consent: Consent unable to be obtained due to patient's condition. .  Procedure: After routine sterile preparation, local anesthesia not performed due to emergent nature of this procedure. A left 3-Lumen 7F Central Venous Catheter was placed by subclavian vein approach and secured by standard fashion. Ultrasound Guidance:   not used.   Number of Attempts: 1  Post-procedure Findings: A post procedural chest x-ray  was ordered and is still pending at this time.  Patient tolerated the procedure well. Electronically signed by Breezy Crawford DO on 11/23/2021 at 1:00 PM    2:32 PM EST    Patient is now had her portable chest x-ray. No evidence of pneumothorax, left TLC appears in place.   ET tube 1.7cm above the gigi, spoke with respiratory therapy will have retracted 2 cm    Electronically signed by Breezy Crawford DO on 11/23/2021 at 2:32 PM

## 2021-11-23 NOTE — PROGRESS NOTES
[address] [place], [place] [address]   Progress Note    Admitting Date and Time: 11/19/2021  9:04 AM  Admit Dx: Elevated troponin [R77.8]  Compression fracture of L2 vertebra, initial encounter ([facility] [address] [S32.020A]    Subjective:    Pt poorly this morning. Patient states she has been having some nausea dry heaving. Currently receiving vancomycin IV. For ECHO. .  at bedside. Patient taken to ECHO. RRT called for lethargy. While team en route, patient became unresponsive monitor showed bradycardia to asystole. CPR was initiated. Patient was intubated. Received 3 doses of epinephrine, 1L bolus of IVF and 2 A of bicarbonate. ROSC achieved. On epinephrine initiated for hypotension. Awaiting transfer to ICU. Dr. Brittney Underwood at bedside with . Discussed wishes and  CODE STATUS. She will remain full code at this time. Lengthy discussion with . All questions answered per physician. Per RN: Patient creatinine increased to 2.2 today. Currently receiving [email or web address]  100 cc voided throughout the night. ROS: denies fever, chills, cp, sob, n/v, HA unless stated above.      vancomycin  1,000 mg IntraVENous Once    vancomycin (VANCOCIN) intermittent dosing (placeholder)   Other RX Placeholder    carvedilol  12.5 mg Oral BID WC    atorvastatin  40 mg Oral Nightly    pantoprazole  40 mg IntraVENous BID    And    sodium chloride (PF)  10 mL IntraVENous BID    sodium chloride flush  5-40 mL IntraVENous 2 times per day    enoxaparin  40 mg SubCUTAneous Daily    insulin lispro  0-12 Units SubCUTAneous TID WC    insulin lispro  0-6 Units SubCUTAneous Nightly     perflutren lipid microspheres, 1.5 mL, ONCE PRN  sodium chloride flush, 5-40 mL, PRN  sodium chloride, 25 mL, PRN  ondansetron, 4 mg, Q8H PRN   Or  ondansetron, 4 mg, Q6H PRN  polyethylene glycol, 17 g, Daily PRN  acetaminophen, 650 mg, Q6H PRN   Or  acetaminophen, 650 mg, Q6H PRN  glucose, 15 g, PRN  dextrose, 12.5 g, PRN  glucagon (rDNA), 1 mg, PRN  dextrose, 100 mL/hr, PRN  HYDROcodone 5 mg - acetaminophen, 1 tablet, Q6H PRN         Objective:    /60   Pulse 70   Temp 97.7 °F (36.5 °C) (Oral)   Resp 16   Ht 5' 7.5\" (1.715 m)   Wt (!) 317 lb (143.8 kg)   SpO2 94%   BMI 48.92 kg/m²   General Appearance: alert and oriented to person, place and time and in no acute distress  Skin: warm and dry  Head: normocephalic and atraumatic  Eyes: pupils equal, round, and reactive to light, extraocular eye movements intact, conjunctivae normal  Neck: neck supple and non tender without mass   Pulmonary/Chest: Diminished wheezes to auscultation bilaterally- no  rales or rhonchi, normal air movement, no respiratory distress  Cardiovascular: normal rate, normal S1 and S2 and no RGM  Abdomen: soft, non-tender, non-distended, normal bowel sounds  Extremities: no cyanosis, no clubbing and generalized edema. Bilateral foot dressings intact. Neurologic: no cranial nerve deficit and speech normal      Recent Labs     11/21/21  1340 11/22/21  0505 11/23/21  0541    132 129*   K 4.5 4.0 4.6   CL 99 98 98   CO2 24 22 19*   BUN 19 27* 42*   CREATININE 0.9 1.5* 2.2*   GLUCOSE 203* 69* 167*   CALCIUM 7.7* 7.6* 7.7*       No results for input(s): ALKPHOS, PROT, LABALBU, BILITOT, AST, ALT in the last 72 hours. Recent Labs     11/21/21  1340 11/22/21  0505 11/23/21  0541   WBC 13.0* 14.4* 14.6*   RBC 3.50 3.14* 3.18*   HGB 9.8* 9.0* 8.9*   HCT 31.2* 28.1* 28.1*   MCV 89.1 89.5 88.4   MCH 28.0 28.7 28.0   MCHC 31.4* 32.0 31.7*   RDW 13.8 13.9 14.1    143 143   MPV 10.7 11.0 10.9         Radiology:   XR CHEST PORTABLE   Final Result      VL LOWER EXTREMITY ARTERIAL SEGMENTAL PRESSURES W PPG   Final Result   Findings consistent with mild arterial stenosis in the left lower extremity. PVR waveforms are suggestive of small caliber arterial disease in the   bilateral feet. XR FOOT LEFT (MIN 3 VIEWS)   Final Result   1.  New amputation of distal aspect of distal phalanx of 1st digit suggesting   acute or chronic osteomyelitis. 2. New flattening of plantar arch and inferior subluxation of talus in   relation to navicular. 3. No subcutaneous gas. XR FOOT RIGHT (2 VIEWS)   Final Result   No evidence of osteomyelitis or subcutaneous gas associated with the right   foot. MRI LUMBAR SPINE WO CONTRAST   Final Result   1. Oblique fracture of the anterior portion of the L2 vertebral body with   mild compression as described above. This could be acute or subacute. The   patient should return for completion of the examination when clinically   feasible. 2. Degenerative change. Changes of ankylosing spondylitis in the lower   thoracic spine. Possible mild central canal stenosis at L1-2.         US ABDOMEN LIMITED Specify organ? LIVER, PANCREAS, SPLEEN, APPENDIX   Final Result   Unremarkable right upper quadrant ultrasound. CT Head WO Contrast   Final Result   No acute intracranial abnormality. Small right posterior parietal/occipital scalp contusion. CT Cervical Spine WO Contrast   Final Result   No traumatic injuries in cervical spine. Focal 2 cm left thyroid nodule. Follow-up evaluation with outpatient thyroid   ultrasound may be obtained if for further evaluation. Ground-glass opacity in right lung apex. Follow-up separate CT chest report   for further details. CT ABDOMEN PELVIS W IV CONTRAST Additional Contrast? None   Final Result   CHEST:      No acute traumatic abnormality is evident in the chest.      Left thyroid nodule. See recommendation below. Enlarged main pulmonary artery, which can be seen in the setting of pulmonary   arterial hypertension. Mild cardiomegaly. Findings suggestive of pulmonary vascular congestion. Findings suggestive of ankylosing spondylitis. ABDOMEN/PELVIS:      Fracture of L2 vertebral body involving the anterior column. No significant   height loss or retropulsion. No evidence of solid or hollow organ injury. Nonspecific skin thickening in the lower anterior abdominal wall which may   represent cellulitis or simple body wall edema. No evidence of abscess. Small hiatal hernia. Atherosclerotic disease. Status post cholecystectomy. RECOMMENDATIONS:   2.6 cm incidental left thyroid nodule. Recommend thyroid US. Reference: J Am Rony Radiol. 2015 Feb;12(2): 143-50         CT CHEST WO CONTRAST   Final Result   CHEST:      No acute traumatic abnormality is evident in the chest.      Left thyroid nodule. See recommendation below. Enlarged main pulmonary artery, which can be seen in the setting of pulmonary   arterial hypertension. Mild cardiomegaly. Findings suggestive of pulmonary vascular congestion. Findings suggestive of ankylosing spondylitis. ABDOMEN/PELVIS:      Fracture of L2 vertebral body involving the anterior column. No significant   height loss or retropulsion. No evidence of solid or hollow organ injury. Nonspecific skin thickening in the lower anterior abdominal wall which may   represent cellulitis or simple body wall edema. No evidence of abscess. Small hiatal hernia. Atherosclerotic disease. Status post cholecystectomy. RECOMMENDATIONS:   2.6 cm incidental left thyroid nodule. Recommend thyroid US. Reference: J Am Rony Radiol.  2015 Feb;12(2): 143-50         MRI FOOT LEFT WO CONTRAST    (Results Pending)       Assessment:  Principal Problem:    Elevated troponin  Active Problems:    Diabetes ([address]    Open wound of right foot    Open wound of left foot    Acute kidney injury ([address]    Bacteremia    UTI (urinary tract infection)    Non-intractable vomiting    Generalized abdominal pain    Frequent falls    Open wnd of foot    Closed fracture of second lumbar vertebra ([address]    Primary hypertension  Resolved Problems:    * No resolved hospital problems. *      Plan:  1. Elevated troponin/NSTEMI-HS Troponin 58>83>112->99. EKG with nonspecific ST changes. Ischemic evaluation with lexiscan stress test once abdominal symptoms have resolved. Continue BB/ASA. Cardiology input appreciated. 2.         N/V- Continued nausea. For possible Gastric Emptying Study/EGD. Contininue PPI. Antiemetics. GI Inpt appreciated. 3.         Abdominal pain- CT A/P nonspecific skin thickening in the lower anterior abdominal wall which may represent cellulitis or simple body wall edema. No evidence of abscess. Status post cholecystectomy. Analgesics prn. PPI. GI input appreciated. 4.         Acute respiratory failure with hypoxia- CT of the cervical spine showed a ground glass opacity in the right lung apex. CT of the chest with interlobular spetal thickening and mild dependent atelectasis. Respiratory film array was negative. Currently requiring 4L NC.   5.        Bacteremia-BC+ GPC in clusters/Staphlococcus Aureus >100,000. Culture left hallux pending. Vancomycin. ID input appreciated. 6.        UTI-urine culture with Staph aureus. Vancomycin. ID input appreciated. 7.        Bilateral foot wounds-x-ray right foot no evidence of osteomyelitis or subcutaneous gas. X-ray left foot new amputation distal aspect of the distal phalanx of the first digit suggestive of acute or chronic osteomyelitis. New flattening of the plantar arch and inferior subluxation of talus. MRI ordered, but patient unable to complete. Podiatry/ID input appreciated  8. DM-A1c 7.9. Continue SSI/hypoglycemic protocol/carb controlled diet continue to follow closely adjust as needed  9. SHELLY-BUN/Crt 42/2.2. Increased from 27/1.5 yesterday. IVF initiated. Not making much urine. Avoid nephrotoxic agents. Nephrology input appreciated.   10.        L2 vertebral body fracture-MRI with oblique fracture of the anterior portion of the L2 vertebral body with mild compression acute versus subacute, possible mid central canal stenosis at L1-2. High risk for kyphoplasty due to BMI and risk of cement extravasation of fracture lines per orthopedic surgery. Recommend conservative treatment. Orthopedic surgery input appreciated. 11.      Frequent falls-CT of head with small right posterior parietal/occipital scalp contusion. PT/OT. Falls precautions. 12.      Thyroid nodule-CT revealing 2.6 cm left thyroid nodule. Enlarged left thyroid lobe. Will need outpatient thyroid ultrasound and follow-up with endocrinology. 13.       S/p cardiopulmonary arrest -septic/hypovolemic shock? in preparation for ECHO, patient with bradycardia to asystole. CPR initiated immediately with bag mask ventilation. Patient was intubated. Patient received 12 minutes of CPR 3 doses of epinephrine, 1L bolus IV fluids, and 2 A of bicarbonate. Patient did have ROSC. Blood pressure 50s/60s with Doppler. Norepinephrine initiated. Patient transferred to ICU overflow. ABG/CBC/CMP/mag/[place]/lactic acid/troponin/CXR/EKG pending. NOTE: This report was transcribed using voice recognition software. Every effort was made to ensure accuracy; however, inadvertent computerized transcription errors may be present. Electronically signed by Federico Garcia CNP on 11/23/2021 at 8:26 AM

## 2021-11-23 NOTE — PROCEDURES
Pt refusing to finish mri. Knees do not clear the tunnel well adding a lot of pressure on her legs when she is in the machine. States she is in too much pain. Moving a lot and yelling \"help me\". Pt did not want to finish test. Only got two scans done but they were both blurry pictures and undiagnosed.   Electronically signed by Pramod Moore on 11/23/2021 at 11:26 AM

## 2021-11-23 NOTE — PROGRESS NOTES
[facility]     [address] [place] [address]  [place], [address]  Phone [phone]   [phone]      Admit Date: 2021  9:04 AM  Pt Name: Rashida Olmstead  MRN: [id number]  : 1950  Reason for Consult:    Chief Complaint   Patient presents with   James Fall     this is second fall this am, some back pain, slight incontinence per EMS, patient has scratched her legs/ankles raw per EMS, patient unable to get up from fall and states she does not remember what happened. Requesting Physician:  Merna Garces MD  PCP: No primary care provider on file. History Obtained From:  patient, chart   ID consulted for Elevated troponin [R77.8]  Compression [facility] L2 vertebra, [facility] [S32.020A]  on hospital day [address]       Chief Complaint   Patient presents with   James Fall     this is second fall this am, some back pain, slight incontinence per EMS, patient has scratched her legs/ankles raw per EMS, patient unable to get up from fall and states she does not remember what happened. HISTORYOF PRESENT ILLNESS   Rashida Olmstead is a 79 y.o. female who presents with   has a past medical history of Back pain and Eczema.       HPI  Pt came in with falls   She had abd pain/nausea/uti sx  Weakness  covid screen neg   Wbc14.8 cr0.7   Ct  Head   Ct a/p  Podiatry following for bilateral full-thickness ulcers down to level of exposed deep fascia,  Cardiology Elevated troponin  Gi following for abd pain, n/v, h/h  Id is asked to see for bacteremia     Dos  21   Pt in bed more awake and alert responds to questions on o2   present  Had Moderna vaccine x1 today       REVIEW OF SYSTEMS     CONSTITUTIONAL:   As in hpi     Medications Prior to Admission: ibuprofen (ADVIL;MOTRIN) 200 MG tablet, Take 400 mg by mouth every 6 hours as needed for Pain   Cinnamon 500 MG CAPS, Take 2 capsules by mouth daily  CURRENT MEDICATIONS     Current Facility-Administered Medications:     vancomycin (VANCOCIN) 1,000 mg in dextrose 5 % 250 mL IVPB, 1,000 mg, IntraVENous, Once, NICK Chavez CNP, Last Rate: 250 mL/hr at 11/23/21 0856, 1,000 mg at 11/23/21 0856    perflutren lipid microspheres (DEFINITY) injection 1.65 mg, 1.5 mL, IntraVENous, ONCE PRN, NICK Chvaez - CNP    vancomycin Cameron Dalton) intermittent dosing (placeholder), , Other, RX Placeholder, NICK Chavez - CNP    carvedilol (COREG) tablet 12.5 mg, 12.5 mg, Oral, BID WC, Hilario Laura MD, 12.5 mg at 11/23/21 0857    atorvastatin (LIPITOR) tablet 40 mg, 40 mg, Oral, Nightly, Hilario Laura MD, 40 mg at 11/22/21 2132    pantoprazole (PROTONIX) injection 40 mg, 40 mg, IntraVENous, BID, 40 mg at 11/23/21 0857 **AND** sodium chloride (PF) 0.9 % injection 10 mL, 10 mL, IntraVENous, BID, Concetta Powell MD, 10 mL at 11/23/21 0858    sodium chloride flush 0.9 % injection 5-40 mL, 5-40 mL, IntraVENous, 2 times per day, Sofia Topete MD, 10 mL at 11/23/21 0858    sodium chloride flush 0.9 % injection 5-40 mL, 5-40 mL, IntraVENous, PRN, Sofia Topete MD, 10 mL at 11/22/21 1051    0.9 % sodium chloride infusion, 25 mL, IntraVENous, PRN, Sofia Topete MD    enoxaparin (LOVENOX) injection 40 mg, 40 mg, SubCUTAneous, Daily, Sofia Topete MD, 40 mg at 11/22/21 0845    ondansetron (ZOFRAN-ODT) disintegrating tablet 4 mg, 4 mg, Oral, Q8H PRN **OR** ondansetron (ZOFRAN) injection 4 mg, 4 mg, IntraVENous, Q6H PRN, Sofia Topete MD, 4 mg at 11/20/21 1751    polyethylene glycol (GLYCOLAX) packet 17 g, 17 g, Oral, Daily PRN, Sofia Topete MD    acetaminophen (TYLENOL) tablet 650 mg, 650 mg, Oral, Q6H PRN, 650 mg at 11/20/21 2038 **OR** acetaminophen (TYLENOL) suppository 650 mg, 650 mg, Rectal, Q6H PRN, Sofia Topete MD    insulin lispro (HUMALOG) injection vial 0-12 Units, 0-12 Units, SubCUTAneous, TID WC, Sofia Topete MD, 4 Units at 11/22/21 1700    insulin lispro (HUMALOG) injection vial 0-6 Units, 0-6 Units, SubCUTAneous, Nightly, Lisa Rendon MD, 1 Units at 11/22/21 2144    glucose (GLUTOSE) 40 % oral gel 15 g, 15 g, Oral, PRN, Lisa Rendon MD    dextrose 50 % IV solution, 12.5 g, IntraVENous, PRN, Lisa Rendon MD    glucagon (rDNA) injection 1 mg, 1 mg, IntraMUSCular, PRN, Lisa Rendon MD    dextrose 5 % solution, 100 mL/hr, IntraVENous, PRN, Lisa Rendon MD    0.9 % sodium chloride infusion, , IntraVENous, Continuous, Lisa Rendon MD, Last Rate: 125 mL/hr at 11/22/21 1407, New Bag at 11/22/21 1407    HYDROcodone-acetaminophen (NORCO) 5-325 MG per tablet 1 tablet, 1 tablet, Oral, Q6H PRN, Pierre Barger MD, 1 tablet at 11/23/21 0857  ALLERGIES     Patient has no known allergies. Immunization History   Administered Date(s) Administered    COVID-19, [place], Primary or Immunocompromised, PF, 100mcg/0.5mL 11/22/2021      Internal Administration   First Dose COVID-19, Moderna, Primary or Immunocompromised, PF, 100mcg/0.5mL  11/22/2021   Second Dose           Last COVID Lab SARS-CoV-2, PCR (no units)   Date Value   11/20/2021 Not Detected     SARS-CoV-2, NAAT (no units)   Date Value   11/19/2021 Not Detected            PAST MEDICAL HISTORY     Past Medical History:   Diagnosis Date    Back pain     Eczema      SURGICAL HISTORY     History reviewed. No pertinent surgical history. FAMILY HISTORY     History reviewed. No pertinent family history.   ·      PHYSICAL EXAM          Vitals:    11/22/21 0830 11/22/21 1600 11/22/21 2300 11/23/21 0745   BP: (!) 128/57 (!) 112/58  125/60   Pulse: 77 67 65 70   Resp: 18 18  16   Temp: 98.1 °F (36.7 °C) 98.4 °F (36.9 °C)  97.7 °F (36.5 °C)   TempSrc: Oral Oral  Oral   SpO2: 95% 97%  94%   Weight:       Height:         Physical Exam   Constitutional/General: awake NAD  Head: NC/AT  Eyes: PERRL, EOMI no petechia   Mouth: Normal mucosa, no thrush Pulmonary: Lungs dec to auscultation bilaterally. Not in respiratory distress on o2  Cardiovascular:  Regular rate and rhythm,    Abdomen: Soft, + BS  distension. Nontender. inc bmi   Extremities: Moves all extremities x 4. Warm and well perfused ble edema aced pictures viewed ble dressed  Pulses:  Distal pulses dec  Skin: Warm and dry without rash  Neurologic:    No focal deficits answer questions  Psych: Normal Affect     DIAGNOSTIC RESULTS   RADIOLOGY:   XR FOOT RIGHT (2 VIEWS)    Result Date: 11/20/2021  EXAMINATION: TWO XRAY VIEWS OF THE RIGHT FOOT 11/20/2021 2:10 pm COMPARISON: None. HISTORY: ORDERING SYSTEM PROVIDED HISTORY: Wounds TECHNOLOGIST PROVIDED HISTORY: Patient has extensive back pain and is unable to move from bed. If possible please perform bedside Reason for exam:->Wounds FINDINGS: No fracture or dislocation of the right foot. Distal interphalangeal joint of 2nd digit is in flexion. No bony erosive changes. There is no subcutaneous gas. Degenerative osteophytosis along superior margin of the tarsal bones. There is plantar calcaneal spurring. Vascular calcifications are present. No evidence of osteomyelitis or subcutaneous gas associated with the right foot. XR FOOT LEFT (MIN 3 VIEWS)    Result Date: 11/20/2021  EXAMINATION: THREE XRAY VIEWS OF THE LEFT FOOT 11/20/2021 2:10 pm COMPARISON: August 28, 2008 HISTORY: ORDERING SYSTEM PROVIDED HISTORY: Wounds TECHNOLOGIST PROVIDED HISTORY: Patient has extensive back pain and is unable to move from bed. If possible please perform bedside Reason for exam:->Wounds FINDINGS: There is amputation of distal aspect of distal phalanx of the 1st digit. No evidence of fracture. There is inferior subluxation of talus in relation to the navicular which is new compared to prior and flattening of plantar arch. There is plantar calcaneal spurring. Vascular calcifications are present. No subcutaneous gas.      1. New amputation of distal aspect of distal phalanx of 1st digit suggesting acute or chronic osteomyelitis. 2. New flattening of plantar arch and inferior subluxation of talus in relation to navicular. 3. No subcutaneous gas. CT Head WO Contrast    Result Date: 11/19/2021  EXAMINATION: CT OF THE HEAD WITHOUT CONTRAST  11/19/2021 1:21 pm TECHNIQUE: CT of the head was performed without the administration of intravenous contrast. Dose modulation, iterative reconstruction, and/or weight based adjustment of the mA/kV was utilized to reduce the radiation dose to as low as reasonably achievable. COMPARISON: None. HISTORY: ORDERING SYSTEM PROVIDED HISTORY: fall TECHNOLOGIST PROVIDED HISTORY: Reason for exam:->fall Has a \"code stroke\" or \"stroke alert\" been called? ->No Decision Support Exception - unselect if not a suspected or confirmed emergency medical condition->Emergency Medical Condition (MA) FINDINGS: The ventricles are normal size, position and contour. There are no masses or mass effect. There are no parenchymal hemorrhages or extra-axial fluid collections. No definite acute CVA. The cerebellum and brainstem are normal.  There is mucosal thickening of the maxillary sinuses. The mastoid air cells are clear. Osseous calvarium is normal.  There is a small area of increased density posterior parietal/occipital scalp just right of midline. No acute intracranial abnormality. Small right posterior parietal/occipital scalp contusion. CT CHEST WO CONTRAST    Result Date: 11/19/2021  EXAMINATION: CT OF THE CHEST WITHOUT CONTRAST; CT OF THE ABDOMEN AND PELVIS WITH CONTRAST 11/19/2021 10:21 am TECHNIQUE: CT of the chest was performed without the administration of intravenous contrast. Multiplanar reformatted images are provided for review.  Dose modulation, iterative reconstruction, and/or weight based adjustment of the mA/kV was utilized to reduce the radiation dose to as low as reasonably achievable.; CT of the abdomen and pelvis was performed with the administration of intravenous contrast. Multiplanar reformatted images are provided for review. Dose modulation, iterative reconstruction, and/or weight based adjustment of the mA/kV was utilized to reduce the radiation dose to as low as reasonably achievable. COMPARISON: None. HISTORY: ORDERING SYSTEM PROVIDED HISTORY: rib pain TECHNOLOGIST PROVIDED HISTORY: Reason for exam:->rib pain Decision Support Exception - unselect if not a suspected or confirmed emergency medical condition->Emergency Medical Condition (MA) FINDINGS: Limited noncontrast technique in the setting of trauma. Allowing for this limitation, findings are as follows: CHEST: Left thyroid nodule measuring 2.6 cm. Enlarged left thyroid lobe. No evidence of mediastinal hematoma. Enlarged main pulmonary artery measuring 36 mm. Coronary atherosclerosis. No pericardial effusion. No pleural effusion or pneumothorax. Interlobular septal thickening (smooth) in both lungs. Mild dependent atelectasis in the upper lungs predominantly. Fullness of the bilateral roslyn. The central airways are patent. Findings of ankylosing spondylitis. Degenerative changes of the right shoulder. Old appearing bilateral anterolateral rib fractures and old appearing right posterior rib fractures. No acute appearing or displaced rib fractures are evident. ABDOMEN/PELVIS: Curvilinear artifact projecting over the left lateral abdomen/pelvis, possibly a detector related artifact. No free air or significant free fluid. No evidence of bowel obstruction. Nonvisualized appendix, however there are no right lower quadrant inflammatory changes to suggest appendicitis. Small hiatal hernia. No acute traumatic abnormality of the liver, pancreas, spleen, adrenal glands, or kidneys. Status post cholecystectomy. No unexpected biliary dilatation. No evidence of hydronephrosis or pyelonephritis.   Subcentimeter low-density foci in the kidneys are too small to accurately characterize. Unremarkable appearance of the bladder and uterus. Left adnexal calcification. No abdominal aortic aneurysm or evidence of dissection. Atherosclerotic disease noted. Skin thickening in the lower anterior abdominal wall, without soft tissue gas or abscess. Fracture of the L2 vertebral body involving the anterior column and transversely oriented in the midportion of the vertebral body. No significant vertebral height loss. Findings suggestive ankylosing spondylitis again noted. CHEST: No acute traumatic abnormality is evident in the chest. Left thyroid nodule. See recommendation below. Enlarged main pulmonary artery, which can be seen in the setting of pulmonary arterial hypertension. Mild cardiomegaly. Findings suggestive of pulmonary vascular congestion. Findings suggestive of ankylosing spondylitis. ABDOMEN/PELVIS: Fracture of L2 vertebral body involving the anterior column. No significant height loss or retropulsion. No evidence of solid or hollow organ injury. Nonspecific skin thickening in the lower anterior abdominal wall which may represent cellulitis or simple body wall edema. No evidence of abscess. Small hiatal hernia. Atherosclerotic disease. Status post cholecystectomy. RECOMMENDATIONS: 2.6 cm incidental left thyroid nodule. Recommend thyroid US. Reference: J Am Rony Radiol. 2015 Feb;12(2): 143-50     CT Cervical Spine WO Contrast    Result Date: 11/19/2021  EXAMINATION: CT OF THE CERVICAL SPINE WITHOUT CONTRAST 11/19/2021 1:21 pm TECHNIQUE: CT of the cervical spine was performed without the administration of intravenous contrast. Multiplanar reformatted images are provided for review. Dose modulation, iterative reconstruction, and/or weight based adjustment of the mA/kV was utilized to reduce the radiation dose to as low as reasonably achievable. COMPARISON: None.  HISTORY: ORDERING SYSTEM PROVIDED HISTORY: fall TECHNOLOGIST PROVIDED HISTORY: Reason for exam:->fall Decision Support Exception - unselect if not a suspected or confirmed emergency medical condition->Emergency Medical Condition (MA) FINDINGS: BONES/ALIGNMENT: There is no acute fracture or traumatic malalignment. DEGENERATIVE CHANGES: Degenerative disc disease most prominent at C5-C6 and C6-C7 with uncovertebral arthropathy. There is multilevel facet arthropathy seen throughout the cervical spine. SOFT TISSUES: There is no prevertebral soft tissue swelling. There is a 2.0 cm left thyroid nodule. Partially visualized patchy ground-glass opacity identified in right lung apex. No traumatic injuries in cervical spine. Focal 2 cm left thyroid nodule. Follow-up evaluation with outpatient thyroid ultrasound may be obtained if for further evaluation. Ground-glass opacity in right lung apex. Follow-up separate CT chest report for further details. MRI LUMBAR SPINE WO CONTRAST    Result Date: 11/20/2021  EXAMINATION: MRI OF THE LUMBAR SPINE WITHOUT CONTRAST, 11/20/2021 8:56 am TECHNIQUE: Multiplanar multisequence MRI of the lumbar spine was performed without the administration of intravenous contrast. COMPARISON: CT abdomen and pelvis dated 11/19/2021 HISTORY: ORDERING SYSTEM PROVIDED HISTORY: lumbar pain TECHNOLOGIST PROVIDED HISTORY: Reason for exam:->lumbar pain FINDINGS: The patient terminated the study prematurely due to severe pain and only sagittal T1 and T2 weighted images are available for review. BONES/ALIGNMENT: There is normal alignment of the spine. There is an oblique fracture extending through the anterior portion of the L2 vertebral body from the anterior cortical surface to the superior endplate. There is mild loss of height of about 25% with some loss of marrow signal.  Findings could represent an acute/subacute fracture. No STIR images are available to assess for the amount of marrow edema. Otherwise, the vertebral body heights are maintained. SPINAL CORD: The conus terminates normally.  SOFT TISSUES: No paraspinal mass identified. DEGENERATIVE CHANGE: No axial images are available to assess the disc space levels. There is disc bulge and central protrusion at L1-2 that may cause mild central canal stenosis. Otherwise, no definite significant central canal stenosis, disc herniation or significant neural foraminal narrowing is seen. There is spurring with disc desiccation in all the lumbar discs. Disc space narrowing is noted, most severe at L5-S1 and moderate to severe at L2-3. Changes of ankylosing spondylitis seen in the visualized lower thoracic spine. 1. Oblique fracture of the anterior portion of the L2 vertebral body with mild compression as described above. This could be acute or subacute. The patient should return for completion of the examination when clinically feasible. 2. Degenerative change. Changes of ankylosing spondylitis in the lower thoracic spine. Possible mild central canal stenosis at L1-2. CT ABDOMEN PELVIS W IV CONTRAST Additional Contrast? None    Result Date: 11/19/2021  EXAMINATION: CT OF THE CHEST WITHOUT CONTRAST; CT OF THE ABDOMEN AND PELVIS WITH CONTRAST 11/19/2021 10:21 am TECHNIQUE: CT of the chest was performed without the administration of intravenous contrast. Multiplanar reformatted images are provided for review. Dose modulation, iterative reconstruction, and/or weight based adjustment of the mA/kV was utilized to reduce the radiation dose to as low as reasonably achievable.; CT of the abdomen and pelvis was performed with the administration of intravenous contrast. Multiplanar reformatted images are provided for review. Dose modulation, iterative reconstruction, and/or weight based adjustment of the mA/kV was utilized to reduce the radiation dose to as low as reasonably achievable. COMPARISON: None.  HISTORY: ORDERING SYSTEM PROVIDED HISTORY: rib pain TECHNOLOGIST PROVIDED HISTORY: Reason for exam:->rib pain Decision Support Exception - unselect if not a suspected or confirmed emergency medical condition->Emergency Medical Condition (MA) FINDINGS: Limited noncontrast technique in the setting of trauma. Allowing for this limitation, findings are as follows: CHEST: Left thyroid nodule measuring 2.6 cm. Enlarged left thyroid lobe. No evidence of mediastinal hematoma. Enlarged main pulmonary artery measuring 36 mm. Coronary atherosclerosis. No pericardial effusion. No pleural effusion or pneumothorax. Interlobular septal thickening (smooth) in both lungs. Mild dependent atelectasis in the upper lungs predominantly. Fullness of the bilateral roslyn. The central airways are patent. Findings of ankylosing spondylitis. Degenerative changes of the right shoulder. Old appearing bilateral anterolateral rib fractures and old appearing right posterior rib fractures. No acute appearing or displaced rib fractures are evident. ABDOMEN/PELVIS: Curvilinear artifact projecting over the left lateral abdomen/pelvis, possibly a detector related artifact. No free air or significant free fluid. No evidence of bowel obstruction. Nonvisualized appendix, however there are no right lower quadrant inflammatory changes to suggest appendicitis. Small hiatal hernia. No acute traumatic abnormality of the liver, pancreas, spleen, adrenal glands, or kidneys. Status post cholecystectomy. No unexpected biliary dilatation. No evidence of hydronephrosis or pyelonephritis. Subcentimeter low-density foci in the kidneys are too small to accurately characterize. Unremarkable appearance of the bladder and uterus. Left adnexal calcification. No abdominal aortic aneurysm or evidence of dissection. Atherosclerotic disease noted. Skin thickening in the lower anterior abdominal wall, without soft tissue gas or abscess. Fracture of the L2 vertebral body involving the anterior column and transversely oriented in the midportion of the vertebral body. No significant vertebral height loss.   Findings suggestive ankylosing spondylitis again noted. CHEST: No acute traumatic abnormality is evident in the chest. Left thyroid nodule. See recommendation below. Enlarged main pulmonary artery, which can be seen in the setting of pulmonary arterial hypertension. Mild cardiomegaly. Findings suggestive of pulmonary vascular congestion. Findings suggestive of ankylosing spondylitis. ABDOMEN/PELVIS: Fracture of L2 vertebral body involving the anterior column. No significant height loss or retropulsion. No evidence of solid or hollow organ injury. Nonspecific skin thickening in the lower anterior abdominal wall which may represent cellulitis or simple body wall edema. No evidence of abscess. Small hiatal hernia. Atherosclerotic disease. Status post cholecystectomy. RECOMMENDATIONS: 2.6 cm incidental left thyroid nodule. Recommend thyroid US. Reference: J Am Rony Radiol. 2015 Feb;12(2): 143-50     US ABDOMEN LIMITED Specify organ? LIVER, PANCREAS, SPLEEN, APPENDIX    Result Date: 11/19/2021  EXAMINATION: RIGHT UPPER QUADRANT ULTRASOUND 11/19/2021 9:36 pm COMPARISON: None. HISTORY: ORDERING SYSTEM PROVIDED HISTORY: abdominal pain TECHNOLOGIST PROVIDED HISTORY: Reason for exam:->abdominal pain Specify organ?->LIVER Specify organ?->PANCREAS Specify organ?->SPLEEN Specify organ?->APPENDIX What reading provider will be dictating this exam?->CRC FINDINGS: LIVER:  The liver demonstrates normal echogenicity without evidence of intrahepatic biliary ductal dilatation. BILIARY SYSTEM:  Cholecystectomy. Common bile duct is within normal limits measuring . RIGHT KIDNEY AND LEFT KIDNEY: The kidneys are grossly unremarkable without evidence of hydronephrosis. PANCREAS:  Visualized portions of the pancreas are unremarkable. OTHER: No evidence of right upper quadrant ascites. Spleen is not well visualized. Unremarkable right upper quadrant ultrasound.      LABS  Recent Labs     11/21/21  1340 11/22/21  0505 11/23/21  0541   WBC 13.0* 14.4* 14.6*   HGB 9.8* 9.0* 8.9*   HCT 31.2* 28.1* 28.1*   MCV 89.1 89.5 88.4    143 143     Recent Labs     11/21/21  1340 11/21/21  1340 11/22/21  0505 11/22/21  0505 11/23/21  0541     --  132  --  129*   K 4.5   < > 4.0   < > 4.6   CL 99   < > 98   < > 98   CO2 24   < > 22   < > 19*   BUN 19  --  27*  --  42*   CREATININE 0.9  --  1.5*  --  2.2*   GFRAA >60  --  41  --  27   LABGLOM >60  --  34  --  22   GLUCOSE 203*  --  69*  --  167*   CALCIUM 7.7*  --  7.6*  --  7.7*    < > = values in this interval not displayed.      Recent Labs     11/22/21  0505   PROCAL 4.70*     No results found for: CRP  No results found for: SEDRATE  No results found for: [id number]  Lab Results   Component Value Date    COVID19 Not Detected 11/20/2021     COVID-19/VANDANA-COV2 LABS  Recent Labs     11/21/21  1340 11/22/21  0505 11/23/21  0541   PROCAL  --  4.70*  --    INR  --   --  1.3   PROTIME  --   --  14.9*   TRIG 109  --   --      Lab Results   Component Value Date    CHOL 98 11/21/2021    TRIG 109 11/21/2021    HDL 25 11/21/2021    LDLCALC 51 11/21/2021    LABVLDL 22 11/21/2021     MICROBIOLOGY:     Cultures :   Lab Results   Component Value Date    [facility]  11/20/2021     Gram stain performed from blood culture bottle media  Gram positive cocci in clusters      [facility] Staphylococcus aureus 11/21/2021     Lab Results   Component Value Date    BLOODCULT2  11/20/2021     Gram stain performed from blood culture bottle media  Gram positive cocci in clusters      [facility] Staphylococcus aureus 11/21/2021          Urine Culture, Routine   Date Value Ref Range Status   11/21/2021 >100,000 CFU/ml  Final          FINAL IMPRESSION    Patient is a 79 y.o. female who presented with   Chief Complaint   Patient presents with    Fall     this is second fall this am, some back pain, slight incontinence per EMS, patient has scratched her legs/ankles raw per EMS, patient unable to get up from fall and states she does not remember what happened. and admitted for Elevated troponin [R77.8]  Compression fracture of L2 vertebra, initial encounter ([facility] [S79.074U]     COMPLICATED Staphylococcus aureus Abnormal  bacteruria with Gram positive cocci in clusters   Poss from dfu -  XRY no acute findings right lower extremity, acute versus chronic osteomyelitis left hallux distal phalanx with no subQ gas  MRI left foot ordered and pending 11/21/2021  -Arterial studies ordered and pending 11/20/2021-Findings consistent with mild arterial stenosis in the left lower extremity. PVR waveforms are suggestive of small caliber arterial disease in the   bilateral feet. -Culture collected and sent 11/22/2021 of left hallux  POSS SURGERY   evgeny  With Saureus bacteruria   F/u blood cx          vancomycin 2000 mg in dextrose 5% 500 ml IVPB, Once  vancomycin (VANCOCIN) intermittent dosing (placeholder), RX Placeholder     Will need echo esr/crp     Imaging and labs were reviewed per medical records and any ID pertinent labs were addressed with the patient. The patient/FAMILY  was educated about the diagnosis, prognosis, indications, risks and benefits of treatment. An opportunity to ask questions was given to the patient/FAMILY and questions were answered. Thank you for involving me in the care of [employer]. Please do not hesitate to call for any questions or concerns.          Electronically signed by Deacon Davenport MD on 11/23/2021 at 9:54 AM

## 2021-11-23 NOTE — PLAN OF CARE
Problem: Falls - Risk of:  Goal: Will remain free from falls  Description: Will remain free from falls  11/22/2021 2223 by Devyn Daigle RN  Outcome: Met This Shift     Problem: Falls - Risk of:  Goal: Absence of physical injury  Description: Absence of physical injury  11/22/2021 2223 by Devyn Daigle RN  Outcome: Met This Shift

## 2021-11-24 LAB
CULTURE, BLOOD 2: ABNORMAL
ORGANISM: ABNORMAL
WOUND/ABSCESS: ABNORMAL

## 2021-11-24 NOTE — FLOWSHEET NOTE
Patient with no pupillary response, no pule, no respirations, no bp. Time of death 19:02. Family aware and at bedside. [facility] to be notified. Confirmation of death with Wanda Ba.

## 2021-11-24 NOTE — DISCHARGE SUMMARY
stated were from picking at the skin. Podiatry was consulted. An xray of the right foot with noevidence of osteomyelitis or subcutaneous gas. Xray of the left foot with findings of new amputation distal aspect of the distal phalanx of the 1st digit suggestive acute or chronic osteomyelitis, new flattening of plantar arch and inferior subluxation of talus. Podiatry ordered vascular studies and planning for surgical intervention. A CT of the head was done and was negative for acute intracranial abnormality, small right posterior parietal/occipital scalp contusion. A CT of the cervical spine showed no traumatic injuries in cervical spine, focal 2 cm left thyroid nodule, ground glass opacity in right lung apex. Orthopedic surgery was consulted for acute on chronic lumbar pain after multiple falls. A MRI was ordered and fractures were not considered compression fractures. She was felt to be a higher risk to undergo kyphoplasty and Ortho felt that there was a risk of cement extravasation of the fracture lines into the superior disc space and recommended PT/OT. Cardiology was planning on an ischemic evaluation with Lexiscan pharmacologic myocardia perfusion stress test once abdominal symptoms have resolved. She was started on a low-dose beta-blocker, statin and aspirin. GI was consulted for abdominal pain, nausea and vomiting and were planning on a possible gastric emptying study and EGD. Patient was also hypoxic on admission and was requiring 4 liters and does not wear oxygen at home. A chest xray was done showing right lower lobe infiltrates. Her urine culture grew Staphylococcus aureus. Her blood cultures grew Gram positive cocci in clusters. She was started on IV Vancomycin and ID was consulted. Lab work showed acute kidney injury. Her creatinine increased from 0.7 to 1.5. Nephrology was consulted. A MRI of the foot was ordered as well as an Echo.   The patient was having her Echo when she became lethargic. A RRT was called. While the team was en route she became unresponsive and then had asystole. CPR and bag mask ventilation was immediately started. She was intubated. She had 12 minutes of CPR, 3 doses of epi, 1 liter fluid bolus. Her  and son had a discussion with family and decided to make the patient a [facility] and requested a Hospice consult. The patient was extubated and placed on a 15 liter NRB mask. At 19:02 the patient was documented to have no pulse, no respirations. Her family was present at the bedside. Condolences to her family. Discharge Exam:  Vitals:    11/23/21 1830 11/23/21 1845 11/23/21 1900 11/23/21 1902   BP:       Pulse: 65 61 50 (!) 0   Resp: 20 18 (!) 0 (!) 0   Temp:       TempSrc:       SpO2: (!) 67% (!) 68% (!) 71%    Weight:       Height:             I/O last 3 completed shifts: In: 0301 [P.O.:420; I.V.:1165]  Out: 200 [Urine:200]  I/O this shift: In: 0   Out: 100 [Urine:100]      LABS:  Recent Labs     11/22/21  0505 11/23/21  0541 11/23/21  1410    129* 128*   K 4.0 4.6 4.6   CL 98 98 96*   CO2 22 19* 19*   BUN 27* 42* 44*   CREATININE 1.5* 2.2* 2.3*   GLUCOSE 69* 167* 212*   CALCIUM 7.6* 7.7* 7.6*       Recent Labs     11/22/21  0505 11/23/21  0541 11/23/21  1410   WBC 14.4* 14.6* 18.8*   RBC 3.14* 3.18* 3.26*   HGB 9.0* 8.9* 9.3*   HCT 28.1* 28.1* 28.0*   MCV 89.5 88.4 85.9   MCH 28.7 28.0 28.5   MCHC 32.0 31.7* 33.2   RDW 13.9 14.1 14.4    143 160   MPV 11.0 10.9 11.3       No results for input(s): POCGLU in the last 72 hours. Imaging:  XR FOOT RIGHT (2 VIEWS)    Result Date: 11/20/2021  EXAMINATION: TWO XRAY VIEWS OF THE RIGHT FOOT 11/20/2021 2:10 pm COMPARISON: None. HISTORY: ORDERING SYSTEM PROVIDED HISTORY: Wounds TECHNOLOGIST PROVIDED HISTORY: Patient has extensive back pain and is unable to move from bed. If possible please perform bedside Reason for exam:->Wounds FINDINGS: No fracture or dislocation of the right foot. Distal interphalangeal joint of 2nd digit is in flexion. No bony erosive changes. There is no subcutaneous gas. Degenerative osteophytosis along superior margin of the tarsal bones. There is plantar calcaneal spurring. Vascular calcifications are present. No evidence of osteomyelitis or subcutaneous gas associated with the right foot. XR FOOT LEFT (MIN 3 VIEWS)    Result Date: 11/20/2021  EXAMINATION: THREE XRAY VIEWS OF THE LEFT FOOT 11/20/2021 2:10 pm COMPARISON: August 28, 2008 HISTORY: ORDERING SYSTEM PROVIDED HISTORY: Wounds TECHNOLOGIST PROVIDED HISTORY: Patient has extensive back pain and is unable to move from bed. If possible please perform bedside Reason for exam:->Wounds FINDINGS: There is amputation of distal aspect of distal phalanx of the 1st digit. No evidence of fracture. There is inferior subluxation of talus in relation to the navicular which is new compared to prior and flattening of plantar arch. There is plantar calcaneal spurring. Vascular calcifications are present. No subcutaneous gas. 1. New amputation of distal aspect of distal phalanx of 1st digit suggesting acute or chronic osteomyelitis. 2. New flattening of plantar arch and inferior subluxation of talus in relation to navicular. 3. No subcutaneous gas. CT Head WO Contrast    Result Date: 11/19/2021  EXAMINATION: CT OF THE HEAD WITHOUT CONTRAST  11/19/2021 1:21 pm TECHNIQUE: CT of the head was performed without the administration of intravenous contrast. Dose modulation, iterative reconstruction, and/or weight based adjustment of the mA/kV was utilized to reduce the radiation dose to as low as reasonably achievable. COMPARISON: None. HISTORY: ORDERING SYSTEM PROVIDED HISTORY: fall TECHNOLOGIST PROVIDED HISTORY: Reason for exam:->fall Has a \"code stroke\" or \"stroke alert\" been called? ->No Decision Support Exception - unselect if not a suspected or confirmed emergency medical condition->Emergency Medical Condition (MA) FINDINGS: The ventricles are normal size, position and contour. There are no masses or mass effect. There are no parenchymal hemorrhages or extra-axial fluid collections. No definite acute CVA. The cerebellum and brainstem are normal.  There is mucosal thickening of the maxillary sinuses. The mastoid air cells are clear. Osseous calvarium is normal.  There is a small area of increased density posterior parietal/occipital scalp just right of midline. No acute intracranial abnormality. Small right posterior parietal/occipital scalp contusion. CT CHEST WO CONTRAST    Result Date: 11/19/2021  EXAMINATION: CT OF THE CHEST WITHOUT CONTRAST; CT OF THE ABDOMEN AND PELVIS WITH CONTRAST 11/19/2021 10:21 am TECHNIQUE: CT of the chest was performed without the administration of intravenous contrast. Multiplanar reformatted images are provided for review. Dose modulation, iterative reconstruction, and/or weight based adjustment of the mA/kV was utilized to reduce the radiation dose to as low as reasonably achievable.; CT of the abdomen and pelvis was performed with the administration of intravenous contrast. Multiplanar reformatted images are provided for review. Dose modulation, iterative reconstruction, and/or weight based adjustment of the mA/kV was utilized to reduce the radiation dose to as low as reasonably achievable. COMPARISON: None. HISTORY: ORDERING SYSTEM PROVIDED HISTORY: rib pain TECHNOLOGIST PROVIDED HISTORY: Reason for exam:->rib pain Decision Support Exception - unselect if not a suspected or confirmed emergency medical condition->Emergency Medical Condition (MA) FINDINGS: Limited noncontrast technique in the setting of trauma. Allowing for this limitation, findings are as follows: CHEST: Left thyroid nodule measuring 2.6 cm. Enlarged left thyroid lobe. No evidence of mediastinal hematoma. Enlarged main pulmonary artery measuring 36 mm. Coronary atherosclerosis. No pericardial effusion.  No pleural effusion or pneumothorax. Interlobular septal thickening (smooth) in both lungs. Mild dependent atelectasis in the upper lungs predominantly. Fullness of the bilateral roslyn. The central airways are patent. Findings of ankylosing spondylitis. Degenerative changes of the right shoulder. Old appearing bilateral anterolateral rib fractures and old appearing right posterior rib fractures. No acute appearing or displaced rib fractures are evident. ABDOMEN/PELVIS: Curvilinear artifact projecting over the left lateral abdomen/pelvis, possibly a detector related artifact. No free air or significant free fluid. No evidence of bowel obstruction. Nonvisualized appendix, however there are no right lower quadrant inflammatory changes to suggest appendicitis. Small hiatal hernia. No acute traumatic abnormality of the liver, pancreas, spleen, adrenal glands, or kidneys. Status post cholecystectomy. No unexpected biliary dilatation. No evidence of hydronephrosis or pyelonephritis. Subcentimeter low-density foci in the kidneys are too small to accurately characterize. Unremarkable appearance of the bladder and uterus. Left adnexal calcification. No abdominal aortic aneurysm or evidence of dissection. Atherosclerotic disease noted. Skin thickening in the lower anterior abdominal wall, without soft tissue gas or abscess. Fracture of the L2 vertebral body involving the anterior column and transversely oriented in the midportion of the vertebral body. No significant vertebral height loss. Findings suggestive ankylosing spondylitis again noted. CHEST: No acute traumatic abnormality is evident in the chest. Left thyroid nodule. See recommendation below. Enlarged main pulmonary artery, which can be seen in the setting of pulmonary arterial hypertension. Mild cardiomegaly. Findings suggestive of pulmonary vascular congestion. Findings suggestive of ankylosing spondylitis.  ABDOMEN/PELVIS: Fracture of L2 vertebral body involving the anterior column. No significant height loss or retropulsion. No evidence of solid or hollow organ injury. Nonspecific skin thickening in the lower anterior abdominal wall which may represent cellulitis or simple body wall edema. No evidence of abscess. Small hiatal hernia. Atherosclerotic disease. Status post cholecystectomy. RECOMMENDATIONS: 2.6 cm incidental left thyroid nodule. Recommend thyroid US. Reference: J Am Rony Radiol. 2015 Feb;12(2): 143-50     CT Cervical Spine WO Contrast    Result Date: 11/19/2021  EXAMINATION: CT OF THE CERVICAL SPINE WITHOUT CONTRAST 11/19/2021 1:21 pm TECHNIQUE: CT of the cervical spine was performed without the administration of intravenous contrast. Multiplanar reformatted images are provided for review. Dose modulation, iterative reconstruction, and/or weight based adjustment of the mA/kV was utilized to reduce the radiation dose to as low as reasonably achievable. COMPARISON: None. HISTORY: ORDERING SYSTEM PROVIDED HISTORY: fall TECHNOLOGIST PROVIDED HISTORY: Reason for exam:->fall Decision Support Exception - unselect if not a suspected or confirmed emergency medical condition->Emergency Medical Condition (MA) FINDINGS: BONES/ALIGNMENT: There is no acute fracture or traumatic malalignment. DEGENERATIVE CHANGES: Degenerative disc disease most prominent at C5-C6 and C6-C7 with uncovertebral arthropathy. There is multilevel facet arthropathy seen throughout the cervical spine. SOFT TISSUES: There is no prevertebral soft tissue swelling. There is a 2.0 cm left thyroid nodule. Partially visualized patchy ground-glass opacity identified in right lung apex. No traumatic injuries in cervical spine. Focal 2 cm left thyroid nodule. Follow-up evaluation with outpatient thyroid ultrasound may be obtained if for further evaluation. Ground-glass opacity in right lung apex. Follow-up separate CT chest report for further details.      MRI LUMBAR SPINE WO CONTRAST    Result Date: 11/20/2021  EXAMINATION: MRI OF THE LUMBAR SPINE WITHOUT CONTRAST, 11/20/2021 8:56 am TECHNIQUE: Multiplanar multisequence MRI of the lumbar spine was performed without the administration of intravenous contrast. COMPARISON: CT abdomen and pelvis dated 11/19/2021 HISTORY: ORDERING SYSTEM PROVIDED HISTORY: lumbar pain TECHNOLOGIST PROVIDED HISTORY: Reason for exam:->lumbar pain FINDINGS: The patient terminated the study prematurely due to severe pain and only sagittal T1 and T2 weighted images are available for review. BONES/ALIGNMENT: There is normal alignment of the spine. There is an oblique fracture extending through the anterior portion of the L2 vertebral body from the anterior cortical surface to the superior endplate. There is mild loss of height of about 25% with some loss of marrow signal.  Findings could represent an acute/subacute fracture. No STIR images are available to assess for the amount of marrow edema. Otherwise, the vertebral body heights are maintained. SPINAL CORD: The conus terminates normally. SOFT TISSUES: No paraspinal mass identified. DEGENERATIVE CHANGE: No axial images are available to assess the disc space levels. There is disc bulge and central protrusion at L1-2 that may cause mild central canal stenosis. Otherwise, no definite significant central canal stenosis, disc herniation or significant neural foraminal narrowing is seen. There is spurring with disc desiccation in all the lumbar discs. Disc space narrowing is noted, most severe at L5-S1 and moderate to severe at L2-3. Changes of ankylosing spondylitis seen in the visualized lower thoracic spine. 1. Oblique fracture of the anterior portion of the L2 vertebral body with mild compression as described above. This could be acute or subacute. The patient should return for completion of the examination when clinically feasible. 2. Degenerative change.   Changes of ankylosing spondylitis in the lower thoracic spine. Possible mild central canal stenosis at L1-2. CT ABDOMEN PELVIS W IV CONTRAST Additional Contrast? None    Result Date: 11/19/2021  EXAMINATION: CT OF THE CHEST WITHOUT CONTRAST; CT OF THE ABDOMEN AND PELVIS WITH CONTRAST 11/19/2021 10:21 am TECHNIQUE: CT of the chest was performed without the administration of intravenous contrast. Multiplanar reformatted images are provided for review. Dose modulation, iterative reconstruction, and/or weight based adjustment of the mA/kV was utilized to reduce the radiation dose to as low as reasonably achievable.; CT of the abdomen and pelvis was performed with the administration of intravenous contrast. Multiplanar reformatted images are provided for review. Dose modulation, iterative reconstruction, and/or weight based adjustment of the mA/kV was utilized to reduce the radiation dose to as low as reasonably achievable. COMPARISON: None. HISTORY: ORDERING SYSTEM PROVIDED HISTORY: rib pain TECHNOLOGIST PROVIDED HISTORY: Reason for exam:->rib pain Decision Support Exception - unselect if not a suspected or confirmed emergency medical condition->Emergency Medical Condition (MA) FINDINGS: Limited noncontrast technique in the setting of trauma. Allowing for this limitation, findings are as follows: CHEST: Left thyroid nodule measuring 2.6 cm. Enlarged left thyroid lobe. No evidence of mediastinal hematoma. Enlarged main pulmonary artery measuring 36 mm. Coronary atherosclerosis. No pericardial effusion. No pleural effusion or pneumothorax. Interlobular septal thickening (smooth) in both lungs. Mild dependent atelectasis in the upper lungs predominantly. Fullness of the bilateral roslyn. The central airways are patent. Findings of ankylosing spondylitis. Degenerative changes of the right shoulder. Old appearing bilateral anterolateral rib fractures and old appearing right posterior rib fractures.   No acute appearing or displaced rib fractures are Radiol. 2015 Feb;12(2): 143-50     US ABDOMEN LIMITED Specify organ? LIVER, PANCREAS, SPLEEN, APPENDIX    Result Date: 11/19/2021  EXAMINATION: RIGHT UPPER QUADRANT ULTRASOUND 11/19/2021 9:36 pm COMPARISON: None. HISTORY: ORDERING SYSTEM PROVIDED HISTORY: abdominal pain TECHNOLOGIST PROVIDED HISTORY: Reason for exam:->abdominal pain Specify organ?->LIVER Specify organ?->PANCREAS Specify organ?->SPLEEN Specify organ?->APPENDIX What reading provider will be dictating this exam?->CRC FINDINGS: LIVER:  The liver demonstrates normal echogenicity without evidence of intrahepatic biliary ductal dilatation. BILIARY SYSTEM:  Cholecystectomy. Common bile duct is within normal limits measuring . RIGHT KIDNEY AND LEFT KIDNEY: The kidneys are grossly unremarkable without evidence of hydronephrosis. PANCREAS:  Visualized portions of the pancreas are unremarkable. OTHER: No evidence of right upper quadrant ascites. Spleen is not well visualized. Unremarkable right upper quadrant ultrasound. Patient Instructions:   Discharge Medication List as of 11/24/2021 12:47 AM      CONTINUE these medications which have NOT CHANGED    Details   ibuprofen (ADVIL;MOTRIN) 200 MG tablet Take 400 mg by mouth every 6 hours as needed for Pain Historical Med      Cinnamon 500 MG CAPS Take 2 capsules by mouth dailyHistorical Med               Note that more than 30 minutes was spent in preparing discharge papers, discussing discharge with patient, medication review, etc.    NOTE: This report was transcribed using voice recognition software. Every effort was made to ensure accuracy; however, inadvertent computerized transcription errors may be present.      Signed:  Electronically signed by NICK Winters Ra, CNP on 11/24/2021 at 2:32 PM

## 2021-11-24 NOTE — PROGRESS NOTES
At 1902 patient was absent of pulse, blood pressure, respirations, and presence of dilated pupils, unresponsive to light. Dr Pittman Records called for pronouncing of the patients death.

## 2021-11-26 LAB
EKG ATRIAL RATE: 70 BPM
EKG P AXIS: 37 DEGREES
EKG P-R INTERVAL: 144 MS
EKG Q-T INTERVAL: 412 MS
EKG QRS DURATION: 84 MS
EKG QTC CALCULATION (BAZETT): 444 MS
EKG R AXIS: 0 DEGREES
EKG T AXIS: 6 DEGREES
EKG VENTRICULAR RATE: 70 BPM

## 2021-12-14 NOTE — PROGRESS NOTES
Physician Progress Note      PATIENT:               La Bundy  CSN #:                  [id number]  :                       1950  ADMIT DATE:       2021 9:04 AM  DISCH DATE:        2021 7:02 PM  RESPONDING  PROVIDER #:        Ananda Garcia CNP          QUERY TEXT:    Pt admitted with N/V abdominal, back pain, foot wounds. Pt noted to have   Sepsis documented in  IM PN. If possible, please document in progress   notes and discharge summary the present on admission status of Sepsis:    The medical record reflects the following:  Risk Factors:  UTI, DM with foot ulcer, obesity, : lung infiltrates, SHELLY,   Cardiac arrest , septic and or hypovolemic shock  Clinical Indicators: On admission: WBC 14.8, , Temp 99.4. : BC MSSA   x 2 bottles, UC >100,000 MSSA, hallux wound cx + MSSA, CT pulm vasc   congestion. : Pro toi 4.70, CXR RLL infiltrate. : Bun/Ceat 44/2.3,   WBC 18.8, CRP 44.2. Treatment: Podiatry, GI, Cardiology consults on admission. : Moderna   vaccine, IV Vanc, Levophed gtt, ID consult.     Thank  you, Lenka Nye RN [phone]  Options provided:  -- Yes, Sepsis was present at the time of the order to admit to the hospital  -- No, Sepsis was not present on admission and developed during the inpatient   stay  -- Other - I will add my own diagnosis  -- Disagree - Not applicable / Not valid  -- Disagree - Clinically unable to determine / Unknown  -- Refer to Clinical Documentation Reviewer    PROVIDER RESPONSE TEXT:    Yes, Sepsis was present at the time of the order to admit to the hospital.    Query created by: Ulises Mast on 2021 8:27 AM      Electronically signed by:  Ananda Garcia CNP 2021 12:28 PM

## (undated) DEVICE — LUBRICANT SURG JELLY ST BACTER TUBE 4.25OZ

## (undated) DEVICE — SPONGE GZ 4IN 4IN 4 PLY N WVN AVANT

## (undated) DEVICE — CONTAINER SPEC COLL 960ML POLYPR TRIANG GRAD INTAKE/OUTPUT

## (undated) DEVICE — YANKAUER,BULB TIP,W/O VENT,RIGID,STERILE: Brand: MEDLINE

## (undated) DEVICE — BLOCK BITE 60FR CAREGUARD

## (undated) DEVICE — TUBING, SUCTION, 1/4" X 10', STRAIGHT: Brand: MEDLINE

## (undated) DEVICE — Device: Brand: DEFENDO VALVE AND CONNECTOR KIT

## (undated) DEVICE — 6 X 9  1.75MIL 4-WALL LABGUARD: Brand: MINIGRIP COMMERCIAL LLC

## (undated) DEVICE — GOWN ISOLATN REG YEL M WT MULTIPLY SIDETIE LEV 2

## (undated) DEVICE — KENDALL 450 SERIES MONITORING FOAM ELECTRODE - RECTANGULAR SHAPE ( 3/PK): Brand: KENDALL

## (undated) DEVICE — KIT BEDSIDE REVITAL OX 500ML

## (undated) DEVICE — MASK,FACE,MAXFLUIDPROTECT,SHIELD/ERLPS: Brand: MEDLINE

## (undated) DEVICE — FORCEPS BX L240CM JAW DIA2.8MM L CAP W/ NDL MIC MESH TOOTH